# Patient Record
Sex: FEMALE | HISPANIC OR LATINO | Employment: OTHER | URBAN - METROPOLITAN AREA
[De-identification: names, ages, dates, MRNs, and addresses within clinical notes are randomized per-mention and may not be internally consistent; named-entity substitution may affect disease eponyms.]

---

## 2017-02-09 DIAGNOSIS — G47.00 INSOMNIA: Primary | ICD-10-CM

## 2017-02-09 RX ORDER — TRAZODONE HYDROCHLORIDE 50 MG/1
TABLET, FILM COATED ORAL
Qty: 180 TABLET | Refills: 0 | COMMUNITY
Start: 2017-02-09 | End: 2017-03-02

## 2017-02-09 NOTE — TELEPHONE ENCOUNTER
Kori Howard is requesting a refill of the following:  Pending Prescriptions:                       Disp   Refills    traZODone (DESYREL) 50 MG tablet [Pharmac*60 tab*0            Sig: TAKE 2 TABLETS BY MOUTH EVERY NIGHT AT BEDTIME    Last Refill : 02/10/2016  Last OV : 02/10/2016  Schedule OV : none     Is it okay for the 30 day supply with 0 refills until Kori Howard schedule an OV non fasting     Please close encounter of approved    Kelli Diaz MA

## 2017-02-14 DIAGNOSIS — G47.00 INSOMNIA: ICD-10-CM

## 2017-02-15 DIAGNOSIS — G47.00 INSOMNIA: ICD-10-CM

## 2017-02-15 RX ORDER — TRAZODONE HYDROCHLORIDE 50 MG/1
TABLET, FILM COATED ORAL
Qty: 180 TABLET | Refills: 0 | OUTPATIENT
Start: 2017-02-15

## 2017-02-15 RX ORDER — TRAZODONE HYDROCHLORIDE 50 MG/1
TABLET, FILM COATED ORAL
Qty: 60 TABLET | Refills: 0 | COMMUNITY
Start: 2017-02-15 | End: 2017-03-02

## 2017-02-15 NOTE — TELEPHONE ENCOUNTER
Called [facility] for Kori Howard refill please call pt. To schedule non fasting office visit

## 2017-02-27 ENCOUNTER — TRANSFERRED RECORDS (OUTPATIENT)
Dept: FAMILY MEDICINE | Facility: CLINIC | Age: 52
End: 2017-02-27

## 2017-03-02 ENCOUNTER — OFFICE VISIT (OUTPATIENT)
Dept: FAMILY MEDICINE | Facility: CLINIC | Age: 52
End: 2017-03-02

## 2017-03-02 VITALS
DIASTOLIC BLOOD PRESSURE: 80 MMHG | BODY MASS INDEX: 28.38 KG/M2 | HEART RATE: 64 BPM | OXYGEN SATURATION: 98 % | WEIGHT: 180.8 LBS | HEIGHT: 67 IN | TEMPERATURE: 98.1 F | SYSTOLIC BLOOD PRESSURE: 110 MMHG

## 2017-03-02 DIAGNOSIS — Z00.00 ROUTINE HISTORY AND PHYSICAL EXAMINATION OF ADULT: Primary | ICD-10-CM

## 2017-03-02 DIAGNOSIS — R53.81 MALAISE: ICD-10-CM

## 2017-03-02 DIAGNOSIS — B00.9 HERPES SIMPLEX VIRUS INFECTION: ICD-10-CM

## 2017-03-02 DIAGNOSIS — N39.3 FEMALE STRESS INCONTINENCE: ICD-10-CM

## 2017-03-02 DIAGNOSIS — F51.04 CHRONIC INSOMNIA: ICD-10-CM

## 2017-03-02 DIAGNOSIS — R73.09 ABNORMAL GLUCOSE: ICD-10-CM

## 2017-03-02 DIAGNOSIS — G89.29 CHRONIC LOW BACK PAIN WITHOUT SCIATICA, UNSPECIFIED BACK PAIN LATERALITY: ICD-10-CM

## 2017-03-02 DIAGNOSIS — M54.50 CHRONIC LOW BACK PAIN WITHOUT SCIATICA, UNSPECIFIED BACK PAIN LATERALITY: ICD-10-CM

## 2017-03-02 LAB
ERYTHROCYTE [DISTWIDTH] IN BLOOD BY AUTOMATED COUNT: 11.4 %
HBA1C MFR BLD: 5.6 % (ref 4–7)
HCT VFR BLD AUTO: 42.6 % (ref 35–47)
HEMOGLOBIN: 13.6 G/DL (ref 11.7–15.7)
MCH RBC QN AUTO: 29.4 PG (ref 26–33)
MCHC RBC AUTO-ENTMCNC: 31.9 G/DL (ref 31–36)
MCV RBC AUTO: 92.1 FL (ref 78–100)
PLATELET COUNT - QUEST: 270 10^9/L (ref 150–375)
RBC # BLD AUTO: 4.62 10*12/L (ref 3.8–5.2)
WBC # BLD AUTO: 7.2 10*9/L (ref 4–11)

## 2017-03-02 PROCEDURE — 80048 BASIC METABOLIC PNL TOTAL CA: CPT | Mod: 90 | Performed by: FAMILY MEDICINE

## 2017-03-02 PROCEDURE — 36415 COLL VENOUS BLD VENIPUNCTURE: CPT | Performed by: FAMILY MEDICINE

## 2017-03-02 PROCEDURE — 88142 CYTOPATH C/V THIN LAYER: CPT | Mod: 90 | Performed by: FAMILY MEDICINE

## 2017-03-02 PROCEDURE — 85027 COMPLETE CBC AUTOMATED: CPT | Performed by: FAMILY MEDICINE

## 2017-03-02 PROCEDURE — 87624 HPV HI-RISK TYP POOLED RSLT: CPT | Mod: 90 | Performed by: FAMILY MEDICINE

## 2017-03-02 PROCEDURE — 84443 ASSAY THYROID STIM HORMONE: CPT | Mod: 90 | Performed by: FAMILY MEDICINE

## 2017-03-02 PROCEDURE — 83036 HEMOGLOBIN GLYCOSYLATED A1C: CPT | Performed by: FAMILY MEDICINE

## 2017-03-02 PROCEDURE — 80061 LIPID PANEL: CPT | Mod: 90 | Performed by: FAMILY MEDICINE

## 2017-03-02 PROCEDURE — 99396 PREV VISIT EST AGE 40-64: CPT | Performed by: FAMILY MEDICINE

## 2017-03-02 RX ORDER — LANOLIN ALCOHOL/MO/W.PET/CERES
1000 CREAM (GRAM) TOPICAL DAILY
COMMUNITY
End: 2024-02-20

## 2017-03-02 RX ORDER — TRAZODONE HYDROCHLORIDE 50 MG/1
TABLET, FILM COATED ORAL
Qty: 180 TABLET | Refills: 0 | Status: SHIPPED | OUTPATIENT
Start: 2017-03-02 | End: 2018-03-22

## 2017-03-02 RX ORDER — IBUPROFEN 800 MG/1
TABLET, FILM COATED ORAL
Qty: 90 TABLET | Refills: 0 | Status: SHIPPED | OUTPATIENT
Start: 2017-03-02 | End: 2019-04-17

## 2017-03-02 RX ORDER — VALACYCLOVIR HYDROCHLORIDE 1 G/1
TABLET, FILM COATED ORAL
Qty: 12 TABLET | Status: SHIPPED | OUTPATIENT
Start: 2017-03-02 | End: 2018-03-22

## 2017-03-02 NOTE — PROGRESS NOTES
SUBJECTIVE:   Kori Howard is an 51 year old G 2 P 2 woman who presents for annual gyn exam.      Periods :irregular cycle: 2 weeks to 2 months  Menopause symptoms:denies  Current contraception:  has had a vasectomy   RODRÍGUEZ exposure: no   History of abnormal Pap smear: yes - Leep done in 1995   Family history of uterine or ovarian cancer: no   Regular self breast exam: yes   History of abnormal mammogram: no   Family history of breast cancer: no   History of abnormal lipids: no    Health Care Maintenance  ACT  Mammogram 2/2017 Normal  Colonoscopy 1/2013- repeat ten years  Pap smear: Normal 2/2015  Dexa: not menopauses  Exercise: no regular program    Patient Active Problem List   Diagnosis     Family history of diabetes mellitus     Herpes simplex virus (HSV) infection     Abnormal glucose     ACP (advance care planning)     Health Care Home     Asthma, exercise induced     Insomnia     Past Surgical History   Procedure Laterality Date     C flor w/o facetec foramot/dskc 1/2 vrt seg, lumbar       C nonspecific procedure  1995     LEEP     Hc colonoscopy thru stoma, diagnostic  1/5/2009     normal/ Stone/ repeat in 10 years     Hysteroscopy,ablation endometrium       Colonoscopy  1/5/2009     NORMAL/ REPEAT IN 10 years   mesh sling- 2009  Family History   Problem Relation Age of Onset     CANCER No family hx of      DIABETES Father      HEART DISEASE Father      Hypertension Father      HEART DISEASE Mother      Current Outpatient Prescriptions   Medication     traZODone (DESYREL) 50 MG tablet     traZODone (DESYREL) 50 MG tablet     valACYclovir (VALTREX) 1000 mg tablet     ibuprofen (ADVIL,MOTRIN) 800 MG tablet     Cholecalciferol (VITAMIN D3) 3000 UNITS TABS     Flaxseed, Linseed, (FLAX SEED OIL) 1000 MG capsule     albuterol (PROAIR HFA, PROVENTIL HFA, VENTOLIN HFA) 108 (90 BASE) MCG/ACT inhaler     magnesium 100 MG CAPS     Calcium 150 MG TABS     cetirizine HCl (ZYRTEC ALLERGY) 10 MG CAPS      "albuterol 90 MCG/ACT inhaler     MULTIVITAMINS OR     FISH OIL 1000 MG OR CAPS     No current facility-administered medications for this visit.      Review Of Systems  Skin: herpes outbreak, one this year  Eyes: negative  Ears/Nose/Throat: negative  Respiratory:excessive daytime drowsiness- snores  Cardiovascular: negative  Gastrointestinal: negative  Genitourinary:  stress incontinence with exercise- failed previous sling surgery/ decreased libido  Musculoskeletal: back pain- does a lot of standing at work  Neurologic: negative  Psychiatric:  insomnia is controlled on trazodone- taking one at bedtime  Hematologic/Lymphatic/Immunologic: negative  Endocrine: negative    OBJECTIVE:  /80 (BP Location: Left arm, Patient Position: Chair, Cuff Size: Adult Regular)  Pulse 64  Temp 98.1  F (36.7  C) (Oral)  Ht 1.702 m (5' 7\")  Wt 82 kg (180 lb 12.8 oz)  LMP 02/16/2017  SpO2 98%  BMI 28.32 kg/m2  General appearance: Healthy    Skin: Normal. No atypical appearing moles on inspection of trunk and extremities.    External ears  and canals clear bilaterally. TM's normal bilaterally. Nose normal without lesions or discharge. Oropharynx normal. Neck supple without palpable adenopathy.    Breasts are symmetric.  No dominant, discrete, fixed  or suspicious masses are noted.  No skin or nipple changes or axillary nodes.      Regular rate and  rhythm. S1 and S2 normal, no murmurs, clicks, gallops or rubs. No edema or JVD. Chest is clear; no wheezes or rales.    The abdomen is soft without tenderness, guarding, mass or organomegaly. Bowel sounds are normal. No CVA tenderness or inguinal adenopathy noted.    Pelvic:  Vagina and vulva are normal.   No palpable uterine or adnexal masses or tenderness.  Pap obtained  Extremities: negative.    Assessment    (Z00.00) Routine history and physical examination of adult  (primary encounter diagnosis)  Comment:   Plan: Lipid Profile, BASIC METABOLIC PANEL ([facility]),         VENOUS COLLECTION, " HEMOGRAM/PLATELET (BFP),         cyanocobalamin (VITAMIN  B-12) 1000 MCG tablet,        TSH with free T4 reflex ([facility]), Hemoglobin A1c        (BFP), ThinPrep Pap and HPV (mRNa E6/E7) ([facility])            (R53.81) Malaise  Comment:   Plan: BASIC METABOLIC PANEL (QUEST), VENOUS         COLLECTION, HEMOGRAM/PLATELET (BFP), TSH with         free T4 reflex (QUEST), Sleep Study Referral            (M54.5,  G89.29) Chronic low back pain without sciatica, unspecified back pain laterality  Comment:   Plan: ibuprofen (ADVIL/MOTRIN) 800 MG tablet            (R73.09) Abnormal glucose  Comment:   Plan: BASIC METABOLIC PANEL (QUEST), VENOUS         COLLECTION, Hemoglobin A1c (BFP)            (N39.3) Female stress incontinence  Comment:   Plan: UROLOGY ADULT REFERRAL            (B00.9) Herpes simplex virus infection  Comment:   Plan: valACYclovir (VALTREX) 1000 mg tablet           (F51.04) Chronic insomnia  Comment:   Plan: traZODone (DESYREL) 50 MG tablet

## 2017-03-02 NOTE — NURSING NOTE
Kori is here for a fasting physical and possible Pap.     Patient is here for a full physical exam and pap.  Pre-Visit Screening :  Immunizations : up to date  Colonoscopy : up to date  Mammogram : up to date  Asthma Action Plan/Test : exercise induced  PHQ9/GAD7 : na  Pulse - regular  Medication Reconciliation: complete    [unfilled]  ETOH screening:  Questions:  1-How often do you have a drink containing alcohol?                           7 times per week(s)  2-How many drinks containing alcohol do you have on a typical day when you are         Drinking?                              1   3- How often do you have 5 or more drinks on one occasion?                              NO     Have you ever:  @None of the patient's responses to the CAGE screening were positive / Negative CAGE score@    Essie.SAVANNA Lopez ([facility])

## 2017-03-02 NOTE — MR AVS SNAPSHOT
After Visit Summary   3/2/2017    Kori Howard    MRN: [id number]           Patient Information     Date Of Birth          1965        Visit Information        Provider Department      3/2/2017 8:00 AM Carol Fleming MD [facility]        Today's Diagnoses     Routine history and physical examination of adult    -  1    Malaise        Chronic low back pain without sciatica, unspecified back pain laterality        Abnormal glucose        Female stress incontinence        Herpes simplex virus infection        Chronic insomnia           Follow-ups after your visit        Additional Services     Sleep Study Referral       [facility], [phone]            UROLOGY ADULT REFERRAL       Your provider has referred you to: [facility] - [place] [phone]   [email or web address]    Dr Durand    Please be aware that coverage of these services is subject to the terms and limitations of your health insurance plan.  Call member services at your health plan with any benefit or coverage questions.      Please bring the following with you to your appointment:    (1) Any X-Rays, CTs or MRIs which have been performed.  Contact the facility where they were done to arrange for  prior to your scheduled appointment.    (2) List of current medications  (3) This referral request   (4) Any documents/labs given to you for this referral                  Who to contact     If you have questions or need follow up information about today's clinic visit or your schedule please contact [facility] directly at [phone].  Normal or non-critical lab and imaging results will be communicated to you by MyChart, letter or phone within 4 business days after the clinic has received the results. If you do not hear from us within 7 days, please contact the clinic through MyChart or phone. If you have a critical or  "abnormal lab result, we will notify you by phone as soon as possible.  Submit refill requests through Lumicity or call your pharmacy and they will forward the refill request to us. Please allow 3 business days for your refill to be completed.          Additional Information About Your Visit        COMPS.comhart Information     Lumicity gives you secure access to your electronic health record. If you see a primary care provider, you can also send messages to your care team and make appointments. If you have questions, please call your primary care clinic.  If you do not have a primary care provider, please call [phone] and they will assist you.        Care EveryWhere ID     This is your Care EveryWhere ID. This could be used by other organizations to access your [place] medical records  [id number]        Your Vitals Were     Pulse Temperature Height Last Period Pulse Oximetry BMI (Body Mass Index)    64 98.1  F (36.7  C) (Oral) 1.702 m (5' 7\") 02/16/2017 98% 28.32 kg/m2       Blood Pressure from Last 3 Encounters:   03/02/17 110/80   02/10/16 128/72   02/05/15 114/68    Weight from Last 3 Encounters:   03/02/17 82 kg (180 lb 12.8 oz)   02/10/16 83.8 kg (184 lb 12.8 oz)   02/05/15 83.5 kg (184 lb)              We Performed the Following     BASIC METABOLIC PANEL ([facility])     Hemoglobin A1c (BFP)     HEMOGRAM/PLATELET (BFP)     Lipid Profile     Sleep Study Referral     TSH with free T4 reflex ([facility])     UROLOGY ADULT REFERRAL     VENOUS COLLECTION          Today's Medication Changes          These changes are accurate as of: 3/2/17  8:50 AM.  If you have any questions, ask your nurse or doctor.               These medicines have changed or have updated prescriptions.        Dose/Directions    traZODone 50 MG tablet   Commonly known as:  DESYREL   This may have changed:  See the new instructions.   Used for:  Chronic insomnia   Changed by:  Carol Fleming MD        TAKE 2 TABLETS BY MOUTH EVERY NIGHT AT BEDTIME "   Quantity:  180 tablet   Refills:  0            Where to get your medicines      These medications were sent to [facility] - [place], [place] - [address] AT [address] & [address]  [address], [facility] [address]     Phone:  [phone]     ibuprofen 800 MG tablet    traZODone 50 MG tablet    valACYclovir 1000 mg tablet                Primary Care Provider Office Phone # Fax #    Carol Fleming [phone]       [facility] [address]  [facility] [address]        Thank you!     Thank you for choosing [facility]  for your care. Our goal is always to provide you with excellent care. Hearing back from our patients is one way we can continue to improve our services. Please take a few minutes to complete the written survey that you may receive in the mail after your visit with us. Thank you!             Your Updated Medication List - Protect others around you: Learn how to safely use, store and throw away your medicines at www.disposemymeds.org.          This list is accurate as of: 3/2/17  8:50 AM.  Always use your most recent med list.                   Brand Name Dispense Instructions for use    albuterol 108 (90 BASE) MCG/ACT Inhaler    PROAIR HFA/PROVENTIL HFA/VENTOLIN HFA    1 Inhaler    Inhale 2 puffs into the lungs every 6 hours       albuterol 90 MCG/ACT inhaler     1 Inhaler    Inhale 2 puffs into the lungs every 4 hours as needed for shortness of breath / dyspnea.       Calcium 150 MG Tabs          cyanocobalamin 1000 MCG tablet    vitamin  B-12     Take 1,000 mcg by mouth daily       fish oil-omega-3 fatty acids 1000 MG capsule      None Entered       flax seed oil 1000 MG capsule      Take 1 capsule by mouth daily       ibuprofen 800 MG tablet    ADVIL/MOTRIN    90 tablet    TAKE ONE TABLET BY MOUTH EVERY 6 HOURS AS NEEDED FOR PAIN.       magnesium 100 MG Caps          MULTIVITAMINS PO       None Entered       traZODone 50 MG tablet    DESYREL    180 tablet    TAKE 2 TABLETS BY MOUTH EVERY NIGHT AT BEDTIME       valACYclovir 1000 mg tablet    VALTREX    12 tablet    2 tablets with onset of symptoms: repeat dose in 12 hours (update refills only)       Vitamin D3 3000 UNITS Tabs          ZYRTEC ALLERGY 10 MG Caps   Generic drug:  cetirizine HCl

## 2017-03-03 LAB
BUN SERPL-MCNC: 13 MG/DL (ref 7–25)
BUN/CREATININE RATIO: NORMAL (CALC) (ref 6–22)
CALCIUM SERPL-MCNC: 9.5 MG/DL (ref 8.6–10.4)
CHLORIDE SERPLBLD-SCNC: 102 MMOL/L (ref 98–110)
CHOLEST SERPL-MCNC: 208 MG/DL (ref 125–200)
CHOLEST/HDLC SERPL: 2.4 (CALC)
CO2 SERPL-SCNC: 25 MMOL/L (ref 20–31)
CREAT SERPL-MCNC: 0.67 MG/DL (ref 0.5–1.05)
EGFR AFRICAN AMERICAN - QUEST: 119 ML/MIN/1.73M2
GFR SERPL CREATININE-BSD FRML MDRD: 102 ML/MIN/1.73M2
GLUCOSE - QUEST: 95 MG/DL (ref 65–99)
HDLC SERPL-MCNC: 86 MG/DL
LDLC SERPL CALC-MCNC: 107 MG/DL (CALC)
NONHDLC SERPL-MCNC: 122 MG/DL (CALC)
POTASSIUM SERPL-SCNC: 4.3 MMOL/L (ref 3.5–5.3)
SODIUM SERPL-SCNC: 138 MMOL/L (ref 135–146)
TRIGL SERPL-MCNC: 77 MG/DL
TSH SERPL-ACNC: 2.49 MIU/L

## 2017-03-07 LAB
CLINICAL HISTORY - QUEST: NORMAL
CYTOTECHNOLOGIST - QUEST: NORMAL
DESCRIPTIVE DIAGNOSIS - QUEST: NORMAL
HPV MRNA E6/E7: NOT DETECTED
LAST PAP DX - QUEST: NORMAL
LMP - QUEST: NORMAL
PREV BX DX - QUEST: NORMAL
SOURCE: NORMAL
STATEMENT OF ADEQUACY - QUEST: NORMAL

## 2017-04-16 ENCOUNTER — MYC REFILL (OUTPATIENT)
Dept: FAMILY MEDICINE | Facility: CLINIC | Age: 52
End: 2017-04-16

## 2017-04-16 DIAGNOSIS — G89.29 CHRONIC LOW BACK PAIN WITHOUT SCIATICA, UNSPECIFIED BACK PAIN LATERALITY: ICD-10-CM

## 2017-04-16 DIAGNOSIS — B00.9 HERPES SIMPLEX VIRUS INFECTION: ICD-10-CM

## 2017-04-16 DIAGNOSIS — F51.04 CHRONIC INSOMNIA: ICD-10-CM

## 2017-04-16 DIAGNOSIS — M54.50 CHRONIC LOW BACK PAIN WITHOUT SCIATICA, UNSPECIFIED BACK PAIN LATERALITY: ICD-10-CM

## 2017-04-16 RX ORDER — IBUPROFEN 800 MG/1
TABLET, FILM COATED ORAL
Qty: 90 TABLET | Refills: 0 | Status: CANCELLED | OUTPATIENT
Start: 2017-04-16

## 2017-04-16 RX ORDER — VALACYCLOVIR HYDROCHLORIDE 1 G/1
TABLET, FILM COATED ORAL
Qty: 12 TABLET | Status: CANCELLED | OUTPATIENT
Start: 2017-04-16

## 2017-04-16 RX ORDER — TRAZODONE HYDROCHLORIDE 50 MG/1
TABLET, FILM COATED ORAL
Qty: 180 TABLET | Refills: 0 | Status: CANCELLED | OUTPATIENT
Start: 2017-04-16

## 2017-04-17 NOTE — TELEPHONE ENCOUNTER
[facility]:  Kori Howard would like a refill of the following medications:  ibuprofen (ADVIL/MOTRIN) 800 MG tablet [Carol Fleming MD]  traZODone (DESYREL) 50 MG tablet [Carol Fleming MD]  valACYclovir (VALTREX) 1000 mg tablet [Carol Fleming MD]    Preferred pharmacy: [facility] [address] [place] [address] [address] AT [place] [address] & [facility]    Comment:  please refill, thanks you

## 2017-06-13 ENCOUNTER — TELEPHONE (OUTPATIENT)
Dept: FAMILY MEDICINE | Facility: CLINIC | Age: 52
End: 2017-06-13

## 2017-07-11 ENCOUNTER — MYC MEDICAL ADVICE (OUTPATIENT)
Dept: FAMILY MEDICINE | Facility: CLINIC | Age: 52
End: 2017-07-11

## 2018-03-22 ENCOUNTER — OFFICE VISIT (OUTPATIENT)
Dept: FAMILY MEDICINE | Facility: CLINIC | Age: 53
End: 2018-03-22

## 2018-03-22 VITALS
RESPIRATION RATE: 20 BRPM | HEIGHT: 67 IN | DIASTOLIC BLOOD PRESSURE: 72 MMHG | BODY MASS INDEX: 26.84 KG/M2 | SYSTOLIC BLOOD PRESSURE: 110 MMHG | HEART RATE: 76 BPM | TEMPERATURE: 97.8 F | WEIGHT: 171 LBS

## 2018-03-22 DIAGNOSIS — F51.04 CHRONIC INSOMNIA: ICD-10-CM

## 2018-03-22 DIAGNOSIS — Z00.00 ROUTINE HISTORY AND PHYSICAL EXAMINATION OF ADULT: Primary | ICD-10-CM

## 2018-03-22 DIAGNOSIS — N95.2 ATROPHIC VAGINITIS: ICD-10-CM

## 2018-03-22 DIAGNOSIS — N95.1 MENOPAUSAL SYNDROME (HOT FLASHES): ICD-10-CM

## 2018-03-22 DIAGNOSIS — B00.9 HERPES SIMPLEX VIRUS INFECTION: ICD-10-CM

## 2018-03-22 LAB
ERYTHROCYTE [DISTWIDTH] IN BLOOD BY AUTOMATED COUNT: 12.3 %
GLUCOSE SERPL-MCNC: 103 MG/DL (ref 60–99)
HBA1C MFR BLD: 5.3 % (ref 4–7)
HCT VFR BLD AUTO: 40.9 % (ref 35–47)
HEMOGLOBIN: 13.5 G/DL (ref 11.7–15.7)
MCH RBC QN AUTO: 29.2 PG (ref 26–33)
MCHC RBC AUTO-ENTMCNC: 33 G/DL (ref 31–36)
MCV RBC AUTO: 88.3 FL (ref 78–100)
PLATELET COUNT - QUEST: 316 10^9/L (ref 150–375)
RBC # BLD AUTO: 4.63 10*12/L (ref 3.8–5.2)
WBC # BLD AUTO: 7.7 10*9/L (ref 4–11)

## 2018-03-22 PROCEDURE — 82947 ASSAY GLUCOSE BLOOD QUANT: CPT | Performed by: FAMILY MEDICINE

## 2018-03-22 PROCEDURE — 36415 COLL VENOUS BLD VENIPUNCTURE: CPT | Performed by: FAMILY MEDICINE

## 2018-03-22 PROCEDURE — 99396 PREV VISIT EST AGE 40-64: CPT | Performed by: FAMILY MEDICINE

## 2018-03-22 PROCEDURE — 80061 LIPID PANEL: CPT | Mod: 90 | Performed by: FAMILY MEDICINE

## 2018-03-22 PROCEDURE — 85027 COMPLETE CBC AUTOMATED: CPT | Performed by: FAMILY MEDICINE

## 2018-03-22 PROCEDURE — 83036 HEMOGLOBIN GLYCOSYLATED A1C: CPT | Performed by: FAMILY MEDICINE

## 2018-03-22 RX ORDER — GABAPENTIN 300 MG/1
300 CAPSULE ORAL AT BEDTIME
Qty: 30 CAPSULE | Refills: 0 | Status: SHIPPED | OUTPATIENT
Start: 2018-03-22 | End: 2019-04-17

## 2018-03-22 RX ORDER — TRAZODONE HYDROCHLORIDE 50 MG/1
TABLET, FILM COATED ORAL
Qty: 180 TABLET | Refills: 3 | Status: SHIPPED | OUTPATIENT
Start: 2018-03-22 | End: 2019-03-21

## 2018-03-22 RX ORDER — VALACYCLOVIR HYDROCHLORIDE 1 G/1
TABLET, FILM COATED ORAL
Qty: 12 TABLET | Status: SHIPPED | OUTPATIENT
Start: 2018-03-22 | End: 2019-04-17

## 2018-03-22 RX ORDER — ESTRADIOL 0.1 MG/G
1 CREAM VAGINAL
Qty: 42.5 G | Refills: 3 | Status: SHIPPED | OUTPATIENT
Start: 2018-03-22 | End: 2020-06-16

## 2018-03-22 NOTE — MR AVS SNAPSHOT
After Visit Summary   3/22/2018    Kori Howard    MRN: [id number]           Patient Information     Date Of Birth          1965        Visit Information        Provider Department      3/22/2018 8:00 AM Carol Fleming MD [facility]        Today's Diagnoses     Routine history and physical examination of adult    -  1    Chronic insomnia        Herpes simplex virus infection        Atrophic vaginitis        Menopausal syndrome (hot flashes)           Follow-ups after your visit        Future tests that were ordered for you today     Open Future Orders        Priority Expected Expires Ordered    Mammo Screening digital (bilat) Routine  3/22/2019 3/22/2018            Who to contact     If you have questions or need follow up information about today's clinic visit or your schedule please contact [facility] directly at [phone].  Normal or non-critical lab and imaging results will be communicated to you by Appierhart, letter or phone within 4 business days after the clinic has received the results. If you do not hear from us within 7 days, please contact the clinic through Appierhart or phone. If you have a critical or abnormal lab result, we will notify you by phone as soon as possible.  Submit refill requests through [employer] or call your pharmacy and they will forward the refill request to us. Please allow 3 business days for your refill to be completed.          Additional Information About Your Visit        MyChart Information     [employer] gives you secure access to your electronic health record. If you see a primary care provider, you can also send messages to your care team and make appointments. If you have questions, please call your primary care clinic.  If you do not have a primary care provider, please call [phone] and they will assist you.        Care EveryWhere ID     This is your Care EveryWhere ID. This could be used by  "other organizations to access your [facility] medical records  [id number]        Your Vitals Were     Pulse Temperature Respirations Height Last Period Breastfeeding?    76 97.8  F (36.6  C) (Oral) 20 1.689 m (5' 6.5\") 12/25/2017 No    BMI (Body Mass Index)                   27.19 kg/m2            Blood Pressure from Last 3 Encounters:   03/22/18 110/72   03/02/17 110/80   02/10/16 128/72    Weight from Last 3 Encounters:   03/22/18 77.6 kg (171 lb)   03/02/17 82 kg (180 lb 12.8 oz)   02/10/16 83.8 kg (184 lb 12.8 oz)              We Performed the Following     Glucose Fasting (BFP)     Hemoglobin A1c (BFP)     HEMOGRAM/PLATELET (BFP)     Lipid Profile     VENOUS COLLECTION          Today's Medication Changes          These changes are accurate as of 3/22/18  8:39 AM.  If you have any questions, ask your nurse or doctor.               Start taking these medicines.        Dose/Directions    estradiol 0.1 MG/GM cream   Commonly known as:  ESTRACE   Used for:  Atrophic vaginitis   Started by:  Carol Fleming MD        Dose:  1 g   Place 1 g vaginally twice a week   Quantity:  42.5 g   Refills:  3       gabapentin 300 MG capsule   Commonly known as:  NEURONTIN   Used for:  Menopausal syndrome (hot flashes)   Started by:  Carol Fleming MD        Dose:  300 mg   Take 1 capsule (300 mg) by mouth At Bedtime   Quantity:  30 capsule   Refills:  0            Where to get your medicines      These medications were sent to [facility] [address] [place] [address] [address] [facility] [address]     Phone:  [phone]     gabapentin 300 MG capsule    traZODone 50 MG tablet         Some of these will need a paper prescription and others can be bought over the counter.  Ask your nurse if you have questions.     Bring a paper prescription for each of these medications     estradiol 0.1 MG/GM cream                Primary Care Provider Office Phone # " Fax #    Carol Fleming [phone] [address] [facility] [address]  [facility] [address]        Equal Access to Services     MARIIAMIGUEL BYRON : Hadsrini colton machuca karinao Sosima, waaxda luqadaha, qaybta kaalmada vishnu, sally pisanocici dash. So [facility] [phone].    ATENCIÓN: Si habla español, tiene a gaming disposición servicios gratuitos de asistencia lingüística. Llame al [phone].    We comply with applicable federal civil rights laws and [place] laws. We do not discriminate on the basis of race, color, national origin, age, disability, sex, sexual orientation, or gender identity.            Thank you!     Thank you for choosing [facility]  for your care. Our goal is always to provide you with excellent care. Hearing back from our patients is one way we can continue to improve our services. Please take a few minutes to complete the written survey that you may receive in the mail after your visit with us. Thank you!             Your Updated Medication List - Protect others around you: Learn how to safely use, store and throw away your medicines at www.disposemymeds.org.          This list is accurate as of 3/22/18  8:39 AM.  Always use your most recent med list.                   Brand Name Dispense Instructions for use Diagnosis    albuterol 108 (90 BASE) MCG/ACT Inhaler    PROAIR HFA/PROVENTIL HFA/VENTOLIN HFA    1 Inhaler    Inhale 2 puffs into the lungs every 6 hours    Asthma, exercise induced       albuterol 90 MCG/ACT inhaler     1 Inhaler    Inhale 2 puffs into the lungs every 4 hours as needed for shortness of breath / dyspnea.    Asthma, exercise induced       Calcium 150 MG Tabs           cyanocobalamin 1000 MCG tablet    vitamin  B-12     Take 1,000 mcg by mouth daily    Routine history and physical examination of adult       estradiol 0.1 MG/GM cream    ESTRACE    42.5 g    Place 1 g vaginally twice a week    Atrophic vaginitis        fish oil-omega-3 fatty acids 1000 MG capsule      None Entered        flax seed oil 1000 MG capsule      Take 1 capsule by mouth daily        gabapentin 300 MG capsule    NEURONTIN    30 capsule    Take 1 capsule (300 mg) by mouth At Bedtime    Menopausal syndrome (hot flashes)       ibuprofen 800 MG tablet    ADVIL/MOTRIN    90 tablet    TAKE ONE TABLET BY MOUTH EVERY 6 HOURS AS NEEDED FOR PAIN.    Chronic low back pain without sciatica, unspecified back pain laterality       magnesium 100 MG Caps           MULTIVITAMINS PO      None Entered        traZODone 50 MG tablet    DESYREL    180 tablet    TAKE 2 TABLETS BY MOUTH EVERY NIGHT AT BEDTIME    Chronic insomnia       valACYclovir 1000 mg tablet    VALTREX    12 tablet    2 tablets with onset of symptoms: repeat dose in 12 hours (update refills only)    Herpes simplex virus infection       Vitamin D3 3000 UNITS Tabs           ZYRTEC ALLERGY 10 MG Caps   Generic drug:  cetirizine HCl

## 2018-03-22 NOTE — NURSING NOTE
Kori Howard is here for a CPX.    Pre-visit planning  Immunizations -up to date  Colonoscopy -is up to date  Mammogram -is up to date  Asthma test --  PHQ9 -  RIVKA 7 -    Questioned patient about current smoking habits.  Pt. has never smoked.  Body mass index is 27.19 kg/(m^2).  PULSE regular  My Chart: active  CLASSIFICATION OF OVERWEIGHT AND OBESITY BY BMI                        Obesity Class           BMI(kg/m2)  Underweight                                    < 18.5  Normal                                         18.5-24.9  Overweight                                     25.0-29.9  OBESITY                     I                  30.0-34.9                             II                 35.0-39.9  EXTREME OBESITY             III                >40                            Patient's  BMI Body mass index is 27.19 kg/(m^2).  Http://hin.nhlbi.nih.gov/menuplanner/menu.cgi  ETOH screening:  Questions:  1-How often do you have a drink containing alcohol?                             1 times per month(s)  2-How many drinks containing alcohol do you have on a typical day when you are         Drinking?                              1   3- How often do you have 5 or more drinks on one occasion?                              never per     Have you ever:  None of the patient's responses to the CAGE screening were positive / Negative CAGE score

## 2018-03-22 NOTE — PROGRESS NOTES
Chief Complaint: Kori Howard is an 52 year old woman who presents for preventive health visit.      Besides routine health maintenance,  she would like to discuss : irregular. menstrual cycle: Last period in December  Symptoms:  Hot flashes  Vaginal dryness  Estrogen vaginal cream advised    Nutritional consultation: lost fifteen pounds with change in diet  Less gluten- not gluten free  Healthy carbs    Off all medication until January  Current supplements as recommended by her nutritionist:  Glutamine  bifadol  Fish oil  Tumeric tea    Testing blood sugar (parent meter): contour-  On rare occasions, blood sugars running over 100    Health Care Maintenance  Pap is up to date  Mammogram Normal 2/2017  Colonoscopy due next year  ACT/AAP      Healthy Habits:  Do you get at least three servings of calcium containing foods daily (dairy, green leafy vegetables, etc.)? Yes  Taking calcium supplements with D  Outside of work or daily activities, how many days per week do you exercise for 30 minutes or longer? Yoga and running  Have you had an eye exam in the past two years? yes  Do you see a dentist twice per year? yes    PHQ-2  Over the last two weeks- Have you been bothered by little interest or pleasure in doing things?  No  Over the last two weeks- Have you been feeling down, depressed, or hopeless?  No    Advanced directive: encouraged    Social History   Substance Use Topics     Smoking status: Never Smoker     Smokeless tobacco: Never Used     Alcohol use 0.0 oz/week     0 drink(s) per week      Comment: on occ       Reviewed orders with patient.  Reviewed health maintenance and updated orders accordingly - Yes      History of abnormal Pap smear: NO - age 30- 65 PAP every 3 years recommended  All Histories reviewed and updated in Epic.      ROS:  C: NEGATIVE for fever, chills, change in weight  I: NEGATIVE for worrisome rashes, moles or lesions  E: NEGATIVE for vision changes or irritation  ENT:  "recovering from recent cold  R: NEGATIVE for significant cough or SOB  B: NEGATIVE for masses, tenderness or discharge  CV: NEGATIVE for chest pain, palpitations or peripheral edema  GI: NEGATIVE for nausea, abdominal pain, heartburn, or change in bowel habits  : NEGATIVE for unusual urinary or vaginal symptoms. Periods are regular.  M: NEGATIVE for significant arthralgias or myalgia  N: NEGATIVE for weakness, dizziness or paresthesias  P: NEGATIVE for changes in mood or affect      OBJECTIVE:  /72  Pulse 76  Temp 97.8  F (36.6  C) (Oral)  Resp 20  Ht 1.689 m (5' 6.5\")  Wt 77.6 kg (171 lb)  LMP 12/25/2017  Breastfeeding? No  BMI 27.19 kg/m2  General appearance: Healthy    Skin: Normal. No atypical appearing moles on inspection of trunk and extremities.    External ears  and canals clear bilaterally. TM's normal bilaterally. Nose normal without lesions or discharge. Oropharynx normal. Neck supple without palpable adenopathy.    Breasts are symmetric.  No dominant, discrete, fixed  or suspicious masses are noted.  No skin or nipple changes or axillary nodes.      Regular rate and  rhythm. S1 and S2 normal, no murmurs, clicks, gallops or rubs. No edema or JVD. Chest is clear; no wheezes or rales.    The abdomen is soft without tenderness, guarding, mass or organomegaly. Bowel sounds are normal. No CVA tenderness or inguinal adenopathy noted.    Pelvic:  Vagina- pale mucosa/ changes of estrogen deficiency. Vulva  normal.   Pap deferred    Rectal exam: deferred    Extremities: negative.      COUNSELING:  Reviewed preventive health counseling, as reflected in patient instructions    ATP III Guidelines  ICSI Preventive Guidelines    ASSESSMENT/PLAN:  (Z00.00) Routine history and physical examination of adult  (primary encounter diagnosis)  Comment:   Plan: Lipid Profile, Glucose Fasting (BFP), VENOUS         COLLECTION, HEMOGRAM/PLATELET (BFP), Mammo         Screening digital (bilat), Hemoglobin A1c (BFP)   "          (F51.04) Chronic insomnia  Comment:   Plan: traZODone (DESYREL) 50 MG tablet            (B00.9) Herpes simplex virus infection  Comment:   Plan: valACYclovir (VALTREX) 1000 mg tablet            (N95.2) Atrophic vaginitis  Comment:   Plan: estradiol (ESTRACE) 0.1 MG/GM cream           (N95.1) Menopausal syndrome (hot flashes)  Comment: recommended either trazodone or gabapentin for insomnia- flashes- but not both  Plan: gabapentin (NEURONTIN) 300 MG capsule

## 2018-03-23 ENCOUNTER — MYC REFILL (OUTPATIENT)
Dept: FAMILY MEDICINE | Facility: CLINIC | Age: 53
End: 2018-03-23

## 2018-03-23 DIAGNOSIS — N95.2 ATROPHIC VAGINITIS: ICD-10-CM

## 2018-03-23 LAB
CHOLEST SERPL-MCNC: 204 MG/DL
CHOLEST/HDLC SERPL: 2.2 (CALC)
HDLC SERPL-MCNC: 94 MG/DL
LDLC SERPL CALC-MCNC: 96 MG/DL (CALC)
NONHDLC SERPL-MCNC: 110 MG/DL (CALC)
TRIGL SERPL-MCNC: 59 MG/DL

## 2018-03-23 RX ORDER — ESTRADIOL 0.1 MG/G
1 CREAM VAGINAL
Qty: 42.5 G | Refills: 3 | Status: CANCELLED | OUTPATIENT
Start: 2018-03-26

## 2018-03-24 NOTE — TELEPHONE ENCOUNTER
Message from [employer]:  Kori Howard would like a refill of the following medications:  estradiol (ESTRACE) 0.1 MG/GM cream [Carol Fleming MD]    Preferred pharmacy: [facility] [address] [place] [address] - [address] [place] [address] [address] AT [facility] & [facility] [address]    Comment:  Jessica never got this prescription. did you send it? Also, didn't do a cholesterol test with my labs? Last year I had 208 that was a bit high. Need a new test this year for my nutritionist. do I need to come back and give more blood? my sugar is 103, so should I be testing more frequently? Could you get a prescription for a meter and strips for that? thanks you< Kori Howard

## 2018-04-07 ENCOUNTER — TELEPHONE (OUTPATIENT)
Dept: FAMILY MEDICINE | Facility: CLINIC | Age: 53
End: 2018-04-07

## 2018-04-28 ENCOUNTER — HEALTH MAINTENANCE LETTER (OUTPATIENT)
Age: 53
End: 2018-04-28

## 2019-03-21 ENCOUNTER — TRANSFERRED RECORDS (OUTPATIENT)
Dept: FAMILY MEDICINE | Facility: CLINIC | Age: 54
End: 2019-03-21

## 2019-03-21 LAB — MAMMOGRAM: NORMAL

## 2019-03-22 ENCOUNTER — HEALTH MAINTENANCE LETTER (OUTPATIENT)
Age: 54
End: 2019-03-22

## 2019-04-17 ENCOUNTER — OFFICE VISIT (OUTPATIENT)
Dept: FAMILY MEDICINE | Facility: CLINIC | Age: 54
End: 2019-04-17

## 2019-04-17 VITALS
HEART RATE: 67 BPM | HEIGHT: 66 IN | WEIGHT: 181 LBS | TEMPERATURE: 97.7 F | OXYGEN SATURATION: 98 % | BODY MASS INDEX: 29.09 KG/M2 | DIASTOLIC BLOOD PRESSURE: 64 MMHG | SYSTOLIC BLOOD PRESSURE: 110 MMHG

## 2019-04-17 DIAGNOSIS — N95.1 MENOPAUSAL SYNDROME (HOT FLASHES): ICD-10-CM

## 2019-04-17 DIAGNOSIS — Z12.11 SPECIAL SCREENING FOR MALIGNANT NEOPLASMS, COLON: ICD-10-CM

## 2019-04-17 DIAGNOSIS — F51.04 CHRONIC INSOMNIA: ICD-10-CM

## 2019-04-17 DIAGNOSIS — R73.09 ABNORMAL GLUCOSE: ICD-10-CM

## 2019-04-17 DIAGNOSIS — B00.9 HERPES SIMPLEX VIRUS INFECTION: ICD-10-CM

## 2019-04-17 DIAGNOSIS — Z00.00 ROUTINE HISTORY AND PHYSICAL EXAMINATION OF ADULT: Primary | ICD-10-CM

## 2019-04-17 PROCEDURE — 99396 PREV VISIT EST AGE 40-64: CPT | Performed by: FAMILY MEDICINE

## 2019-04-17 PROCEDURE — 36415 COLL VENOUS BLD VENIPUNCTURE: CPT | Performed by: FAMILY MEDICINE

## 2019-04-17 RX ORDER — GABAPENTIN 300 MG/1
300 CAPSULE ORAL AT BEDTIME
Qty: 30 CAPSULE | Refills: 11 | Status: SHIPPED | OUTPATIENT
Start: 2019-04-17 | End: 2019-04-17

## 2019-04-17 RX ORDER — GABAPENTIN 300 MG/1
300 CAPSULE ORAL AT BEDTIME
Qty: 90 CAPSULE | Refills: 3 | Status: SHIPPED | OUTPATIENT
Start: 2019-04-17 | End: 2019-10-28

## 2019-04-17 RX ORDER — TRAZODONE HYDROCHLORIDE 50 MG/1
TABLET, FILM COATED ORAL
Qty: 180 TABLET | Refills: 3 | Status: SHIPPED | OUTPATIENT
Start: 2019-04-17 | End: 2019-10-27

## 2019-04-17 RX ORDER — VALACYCLOVIR HYDROCHLORIDE 1 G/1
TABLET, FILM COATED ORAL
Qty: 12 TABLET | Status: SHIPPED | OUTPATIENT
Start: 2019-04-17 | End: 2020-06-16

## 2019-04-17 RX ORDER — TRAZODONE HYDROCHLORIDE 50 MG/1
TABLET, FILM COATED ORAL
Qty: 60 TABLET | Refills: 11 | Status: SHIPPED | OUTPATIENT
Start: 2019-04-17 | End: 2019-04-17

## 2019-04-17 SDOH — HEALTH STABILITY: MENTAL HEALTH: HOW MANY STANDARD DRINKS CONTAINING ALCOHOL DO YOU HAVE ON A TYPICAL DAY?: 1 OR 2

## 2019-04-17 SDOH — HEALTH STABILITY: MENTAL HEALTH: HOW OFTEN DO YOU HAVE 6 OR MORE DRINKS ON ONE OCCASION?: LESS THAN MONTHLY

## 2019-04-17 SDOH — HEALTH STABILITY: MENTAL HEALTH: HOW OFTEN DO YOU HAVE A DRINK CONTAINING ALCOHOL?: 2-3 TIMES A WEEK

## 2019-04-17 ASSESSMENT — MIFFLIN-ST. JEOR: SCORE: 1447.76

## 2019-04-17 NOTE — NURSING NOTE
Patient is here for a full physical exam. Healthcare directive given to patient today to fill out and turn back in when finished to have on file.     Pre-Visit Screening :  Immunizations : up to date  Colon Screening : is due and ordered today, patient has scheduled in 2 weeks  Mammogram: up to date   Asthma Action Test/Plan : ACT given today   PHQ9 :  PHQ-2 done today   GAD7 :  No concerns  Patient's  BMI There is no height or weight on file to calculate BMI.  Questioned patient about current smoking habits.  Pt. has never smoked.  OK to leave a detailed voice message regarding today's visit yes, phone # [phone]

## 2019-04-17 NOTE — PROGRESS NOTES
Chief Complaint: Kori Howard is an 53 year old woman who presents for preventive health visit.     Perimenopausal- menstrual periods every three months  Intermittent hot flashes  Vaginal dryness has improved- no longer using vaginal estrogen     Besides routine health maintenance,  she would like to discuss :.     Arthritis of hands- currently treated with:  Tumeric  chiro  Apple cider vinegar  Takes magnesium for leg cramps:    Chronic insomnia, hot flashes- will take gabapentin with trazodone- but not consistently    Healthy Habits:  Do you get at least three servings of calcium containing foods daily (dairy, green leafy vegetables, etc.)? yes    Vitamin D 2000IU winter and 1000 IU in summer  Calcium : takes 500 mg (occasional cheese, almond milk)  Outside of work or daily activities, how many days per week do you exercise for 30 minutes or longer?  Active lifestyle  Have you had an eye exam in the past two years? yes  Do you see a dentist twice per year? encouraged    PHQ-2  Over the last two weeks- Have you been bothered by little interest or pleasure in doing things?  No  Over the last two weeks- Have you been feeling down, depressed, or hopeless?  No      Advanced directive: counseled and encouraged       Social History     Tobacco Use     Smoking status: Never Smoker     Smokeless tobacco: Never Used   Substance Use Topics     Alcohol use: Yes     Alcohol/week: 0.0 oz     Comment: on occ       The patient does not drink >3 drinks per day nor >7 drinks per week.    Reviewed orders with patient.  Reviewed health maintenance and updated orders accordingly - Yes      History of abnormal Pap smear: NO - age 30- 65 PAP every 3 years recommended  NO - age 30-65 PAP every 5 years with negative HPV co-testing recommended  All Histories reviewed and updated in Epic.      ROS:   ROS: 10 point ROS neg other than the symptoms noted above in the HPI.  She is concerned about weight gain- drinking more alcohol-  "diet is less strict      OBJECTIVE:  /64 (BP Location: Left arm, Patient Position: Sitting, Cuff Size: Adult Regular)   Pulse 67   Temp 97.7  F (36.5  C) (Oral)   Ht 1.676 m (5' 6\")   Wt 82.1 kg (181 lb)   SpO2 98%   BMI 29.21 kg/m    General appearance: Healthy    Skin: Normal. No atypical appearing moles on inspection of trunk and extremities.    External ears  and canals clear bilaterally. TM's normal bilaterally. Nose normal without lesions or discharge. Oropharynx normal. Neck supple without palpable adenopathy.    Breasts are symmetric.  No dominant, discrete, fixed  or suspicious masses are noted.  No skin or nipple changes or axillary nodes.      Regular rate and  rhythm. S1 and S2 normal, no murmurs, clicks, gallops or rubs. No edema or JVD. Chest is clear; no wheezes or rales.    The abdomen is soft without tenderness, guarding, mass or organomegaly. Bowel sounds are normal. No CVA tenderness or inguinal adenopathy noted.    Pelvic:  deferred.    Rectal exam: deferred    Extremities: negative.      COUNSELING:  Reviewed preventive health counseling, as reflected in patient instructions  Special attention given to:        Regular exercise       Healthy diet/nutrition       Osteoporosis Prevention/Bone Health       Advance Care Planning    ATP III Guidelines  ICSI Preventive Guidelines    ASSESSMENT/PLAN:  (Z00.00) Routine history and physical examination of adult  (primary encounter diagnosis)  Comment:   Plan: VENOUS COLLECTION, Lipid Profile ([facility]),         CANCELED: HIV 1/2 Agn Mayte 4th Gen w Reflex         ([facility])            (R73.09) Abnormal glucose  Comment:   Plan: BASIC METABOLIC PANEL ([facility]), VENOUS         COLLECTION            (Z12.11) Special screening for malignant neoplasms, colon  Comment:   Plan: GASTROENTEROLOGY ADULT REF PROCEDURE ONLY         Other; [facility] [phone]            (N95.1) Menopausal syndrome (hot flashes)  Comment:   Plan: gabapentin (NEURONTIN) 300 MG " capsule,         DISCONTINUED: gabapentin (NEURONTIN) 300 MG         capsule           (F51.04) Chronic insomnia  Comment:   Plan: traZODone (DESYREL) 50 MG tablet, DISCONTINUED:        traZODone (DESYREL) 50 MG tablet            (B00.9) Herpes simplex virus infection  Comment:   Plan: valACYclovir (VALTREX) 1000 mg tablet

## 2019-04-18 LAB
BUN SERPL-MCNC: 14 MG/DL (ref 7–25)
BUN/CREATININE RATIO: NORMAL (CALC) (ref 6–22)
CALCIUM SERPL-MCNC: 9.7 MG/DL (ref 8.6–10.4)
CHLORIDE SERPLBLD-SCNC: 102 MMOL/L (ref 98–110)
CHOLEST SERPL-MCNC: 223 MG/DL
CHOLEST/HDLC SERPL: 2.4 (CALC)
CO2 SERPL-SCNC: 25 MMOL/L (ref 20–32)
CREAT SERPL-MCNC: 0.68 MG/DL (ref 0.5–1.05)
EGFR AFRICAN AMERICAN - QUEST: 117 ML/MIN/1.73M2
GFR SERPL CREATININE-BSD FRML MDRD: 101 ML/MIN/1.73M2
GLUCOSE - QUEST: 93 MG/DL (ref 65–99)
HDLC SERPL-MCNC: 94 MG/DL
LDLC SERPL CALC-MCNC: 115 MG/DL (CALC)
NONHDLC SERPL-MCNC: 129 MG/DL (CALC)
POTASSIUM SERPL-SCNC: 4.3 MMOL/L (ref 3.5–5.3)
SODIUM SERPL-SCNC: 137 MMOL/L (ref 135–146)
TRIGL SERPL-MCNC: 59 MG/DL

## 2019-04-18 ASSESSMENT — ASTHMA QUESTIONNAIRES: ACT_TOTALSCORE: 25

## 2019-04-30 ENCOUNTER — TRANSFERRED RECORDS (OUTPATIENT)
Dept: FAMILY MEDICINE | Facility: CLINIC | Age: 54
End: 2019-04-30

## 2019-09-29 ENCOUNTER — HEALTH MAINTENANCE LETTER (OUTPATIENT)
Age: 54
End: 2019-09-29

## 2019-10-27 ENCOUNTER — MYC REFILL (OUTPATIENT)
Dept: FAMILY MEDICINE | Facility: CLINIC | Age: 54
End: 2019-10-27

## 2019-10-27 DIAGNOSIS — N95.1 MENOPAUSAL SYNDROME (HOT FLASHES): ICD-10-CM

## 2019-10-27 DIAGNOSIS — F51.04 CHRONIC INSOMNIA: ICD-10-CM

## 2019-10-27 RX ORDER — GABAPENTIN 300 MG/1
300 CAPSULE ORAL AT BEDTIME
Qty: 90 CAPSULE | Refills: 3 | Status: CANCELLED | OUTPATIENT
Start: 2019-10-27

## 2019-10-28 ENCOUNTER — HEALTH MAINTENANCE LETTER (OUTPATIENT)
Age: 54
End: 2019-10-28

## 2019-10-28 RX ORDER — GABAPENTIN 300 MG/1
300 CAPSULE ORAL AT BEDTIME
Qty: 90 CAPSULE | Refills: 1 | Status: SHIPPED | OUTPATIENT
Start: 2019-10-28 | End: 2020-04-08

## 2019-10-28 RX ORDER — TRAZODONE HYDROCHLORIDE 50 MG/1
TABLET, FILM COATED ORAL
Qty: 180 TABLET | Refills: 1 | Status: SHIPPED | OUTPATIENT
Start: 2019-10-28 | End: 2020-03-31

## 2019-10-28 NOTE — TELEPHONE ENCOUNTER
Kori Howard is requesting a refill of:    Pending Prescriptions:                       Disp   Refills    traZODone (DESYREL) 50 MG tablet          180 ta*1            Sig: TAKE 2 TABLETS BY MOUTH EVERY NIGHT AT BEDTIME    gabapentin (NEURONTIN) 300 MG capsule     90 cap*1            Sig: Take 1 capsule (300 mg) by mouth At Bedtime    Please close encounter if RX was sent. Thanks, Angeline

## 2020-01-13 ENCOUNTER — MYC REFILL (OUTPATIENT)
Dept: FAMILY MEDICINE | Facility: CLINIC | Age: 55
End: 2020-01-13

## 2020-01-13 DIAGNOSIS — F51.04 CHRONIC INSOMNIA: ICD-10-CM

## 2020-01-13 DIAGNOSIS — N95.1 MENOPAUSAL SYNDROME (HOT FLASHES): ICD-10-CM

## 2020-01-13 RX ORDER — GABAPENTIN 300 MG/1
300 CAPSULE ORAL AT BEDTIME
Qty: 90 CAPSULE | Refills: 1 | Status: CANCELLED | OUTPATIENT
Start: 2020-01-13

## 2020-01-13 RX ORDER — TRAZODONE HYDROCHLORIDE 50 MG/1
TABLET, FILM COATED ORAL
Qty: 180 TABLET | Refills: 1 | Status: CANCELLED | OUTPATIENT
Start: 2020-01-13

## 2020-03-21 DIAGNOSIS — N95.1 MENOPAUSAL SYNDROME (HOT FLASHES): ICD-10-CM

## 2020-03-21 DIAGNOSIS — F51.04 CHRONIC INSOMNIA: ICD-10-CM

## 2020-03-23 RX ORDER — GABAPENTIN 300 MG/1
CAPSULE ORAL
Qty: 90 CAPSULE | Refills: 1 | COMMUNITY
Start: 2020-03-23

## 2020-03-23 RX ORDER — TRAZODONE HYDROCHLORIDE 50 MG/1
TABLET, FILM COATED ORAL
Qty: 180 TABLET | Refills: 1 | COMMUNITY
Start: 2020-03-23

## 2020-03-23 NOTE — TELEPHONE ENCOUNTER
Kori Howard is requesting a refill of:    Refused Prescriptions:                       Disp   Refills    traZODone (DESYREL) 50 MG tablet [Pharmacy*180 ta*1        Sig: TAKE 2 TABLETS BY MOUTH  EVERY NIGHT AT BEDTIME  Refused By: LILLIAN LOU  Reason for Refusal: Patient has requested refill too soon  Reason for Refusal Comment: Refilled on 10/28/19 for 180 with 1 refill    gabapentin (NEURONTIN) 300 MG capsule [Pha*90 cap*1        Sig: TAKE 1 CAPSULE BY MOUTH AT  BEDTIME  Refused By: LILLIAN LOU  Reason for Refusal: Patient has requested refill too soon  Reason for Refusal Comment: Refilled from 10/28/19 for 90 with 1 refill

## 2020-03-31 DIAGNOSIS — F51.04 CHRONIC INSOMNIA: ICD-10-CM

## 2020-03-31 RX ORDER — TRAZODONE HYDROCHLORIDE 50 MG/1
TABLET, FILM COATED ORAL
Qty: 180 TABLET | Refills: 0 | Status: SHIPPED | OUTPATIENT
Start: 2020-03-31 | End: 2020-04-08

## 2020-03-31 NOTE — TELEPHONE ENCOUNTER
Pending Prescriptions:                       Disp   Refills    traZODone (DESYREL) 50 MG tablet [Pharmac*180 ta*             Sig: TAKE 2 TABLETS BY MOUTH  EVERY NIGHT AT BEDTIME    This was a [facility] pt    THIS IS MAIL ORDER    Last ov was a year ago 4-2019 at px  Last refill was for 6 months on   Fax deny or advise  [place]

## 2020-03-31 NOTE — TELEPHONE ENCOUNTER
Call patient and inform her Dr Fleming is retired. I sent a 3 month refill but any further refills will need a visit.   Dr Fleming also filled gabapentin to take at bedtime for 6 months. She requested you return after 6 months to discuss this medication.  I have not refilled it. Are you taking it any more.

## 2020-04-08 ENCOUNTER — MYC REFILL (OUTPATIENT)
Dept: FAMILY MEDICINE | Facility: CLINIC | Age: 55
End: 2020-04-08

## 2020-04-08 DIAGNOSIS — F51.04 CHRONIC INSOMNIA: ICD-10-CM

## 2020-04-08 DIAGNOSIS — N95.1 MENOPAUSAL SYNDROME (HOT FLASHES): ICD-10-CM

## 2020-04-08 RX ORDER — GABAPENTIN 300 MG/1
300 CAPSULE ORAL AT BEDTIME
Qty: 30 CAPSULE | Refills: 0 | Status: SHIPPED | OUTPATIENT
Start: 2020-04-08 | End: 2020-06-16

## 2020-04-08 RX ORDER — GABAPENTIN 300 MG/1
300 CAPSULE ORAL AT BEDTIME
Qty: 30 CAPSULE | Refills: 0 | Status: CANCELLED | OUTPATIENT
Start: 2020-04-08

## 2020-04-08 RX ORDER — TRAZODONE HYDROCHLORIDE 50 MG/1
TABLET, FILM COATED ORAL
Qty: 60 TABLET | Refills: 0 | Status: SHIPPED | OUTPATIENT
Start: 2020-04-08 | End: 2020-06-16

## 2020-04-08 NOTE — TELEPHONE ENCOUNTER
Pt has CPX 05/12/20 with [facility]. She will be 30 days short and wishes this to go to her mail order. Please advise.    Kori Howard is requesting a refill of:    Pending Prescriptions:                       Disp   Refills    traZODone (DESYREL) 50 MG tablet          60 tab*0          gabapentin (NEURONTIN) 300 MG capsule     30 cap*0            Sig: Take 1 capsule (300 mg) by mouth At Bedtime

## 2020-06-16 ENCOUNTER — OFFICE VISIT (OUTPATIENT)
Dept: FAMILY MEDICINE | Facility: CLINIC | Age: 55
End: 2020-06-16

## 2020-06-16 VITALS
WEIGHT: 186.4 LBS | HEART RATE: 69 BPM | OXYGEN SATURATION: 97 % | DIASTOLIC BLOOD PRESSURE: 76 MMHG | BODY MASS INDEX: 29.96 KG/M2 | RESPIRATION RATE: 18 BRPM | TEMPERATURE: 98.1 F | SYSTOLIC BLOOD PRESSURE: 114 MMHG | HEIGHT: 66 IN

## 2020-06-16 DIAGNOSIS — Z13.220 SCREENING CHOLESTEROL LEVEL: ICD-10-CM

## 2020-06-16 DIAGNOSIS — N95.1 MENOPAUSAL SYNDROME (HOT FLASHES): ICD-10-CM

## 2020-06-16 DIAGNOSIS — R39.15 URGENCY OF URINATION: ICD-10-CM

## 2020-06-16 DIAGNOSIS — F51.04 CHRONIC INSOMNIA: ICD-10-CM

## 2020-06-16 DIAGNOSIS — B00.9 HERPES SIMPLEX VIRUS INFECTION: ICD-10-CM

## 2020-06-16 DIAGNOSIS — Z00.00 ROUTINE HISTORY AND PHYSICAL EXAMINATION OF ADULT: Primary | ICD-10-CM

## 2020-06-16 DIAGNOSIS — Z11.51 SPECIAL SCREENING EXAMINATION FOR HUMAN PAPILLOMAVIRUS (HPV): ICD-10-CM

## 2020-06-16 DIAGNOSIS — Z13.1 SCREENING FOR DIABETES MELLITUS: ICD-10-CM

## 2020-06-16 LAB
% GRANULOCYTES: 54.5 %
ALBUMIN (URINE): NORMAL MG/DL
APPEARANCE UR: CLEAR
BACTERIA, UR: NORMAL
BILIRUB UR QL: NORMAL
CASTS/LPF: NORMAL
CHOLEST SERPL-MCNC: 222 MG/DL (ref 0–199)
CHOLEST/HDLC SERPL: 2 {RATIO} (ref 0–5)
COLOR UR: YELLOW
EP/HPF: NORMAL
GLUCOSE SERPL-MCNC: 90 MG/DL (ref 60–99)
GLUCOSE URINE: NORMAL MG/DL
HCT VFR BLD AUTO: 43 % (ref 35–47)
HDLC SERPL-MCNC: 103 MG/DL (ref 40–150)
HEMOGLOBIN: 14.3 G/DL (ref 11.7–15.7)
HGB UR QL: NORMAL
KETONES UR QL: NORMAL MG/DL
LDLC SERPL CALC-MCNC: 108 MG/DL (ref 0–130)
LEUKOCYTE ESTERASE - QUEST: NORMAL
LYMPHOCYTES NFR BLD AUTO: 35.7 %
MAMMOGRAM: NORMAL
MCH RBC QN AUTO: 30 PG (ref 26–33)
MCHC RBC AUTO-ENTMCNC: 33.3 G/DL (ref 31–36)
MCV RBC AUTO: 90.4 FL (ref 78–100)
MISC.: NORMAL
MONOCYTES NFR BLD AUTO: 9.8 %
NITRITE UR QL STRIP: NORMAL
PH UR STRIP: 7 PH (ref 5–7)
PLATELET COUNT - QUEST: 292 10^9/L (ref 150–375)
RBC # BLD AUTO: 4.76 10*12/L (ref 3.8–5.2)
RBC, UR MICRO: NORMAL (ref ?–2)
SP. GRAVITY: 1.01
TRIGL SERPL-MCNC: 56 MG/DL (ref 0–149)
UROBILINOGEN UR QL STRIP: 0.2 EU/DL (ref 0.2–1)
WBC # BLD AUTO: 5.7 10*9/L (ref 4–11)
WBC, UR MICRO: NORMAL (ref ?–2)

## 2020-06-16 PROCEDURE — 36415 COLL VENOUS BLD VENIPUNCTURE: CPT | Performed by: FAMILY MEDICINE

## 2020-06-16 PROCEDURE — 90471 IMMUNIZATION ADMIN: CPT | Performed by: FAMILY MEDICINE

## 2020-06-16 PROCEDURE — 80061 LIPID PANEL: CPT | Performed by: FAMILY MEDICINE

## 2020-06-16 PROCEDURE — 99396 PREV VISIT EST AGE 40-64: CPT | Mod: 25 | Performed by: FAMILY MEDICINE

## 2020-06-16 PROCEDURE — 90714 TD VACC NO PRESV 7 YRS+ IM: CPT | Performed by: FAMILY MEDICINE

## 2020-06-16 PROCEDURE — 82947 ASSAY GLUCOSE BLOOD QUANT: CPT | Performed by: FAMILY MEDICINE

## 2020-06-16 PROCEDURE — 81001 URINALYSIS AUTO W/SCOPE: CPT | Performed by: FAMILY MEDICINE

## 2020-06-16 PROCEDURE — 85025 COMPLETE CBC W/AUTO DIFF WBC: CPT | Performed by: FAMILY MEDICINE

## 2020-06-16 RX ORDER — VALACYCLOVIR HYDROCHLORIDE 1 G/1
TABLET, FILM COATED ORAL
Qty: 12 TABLET | Refills: 3 | Status: SHIPPED | OUTPATIENT
Start: 2020-06-16 | End: 2021-07-21

## 2020-06-16 RX ORDER — TRAZODONE HYDROCHLORIDE 50 MG/1
50 TABLET, FILM COATED ORAL AT BEDTIME
Qty: 90 TABLET | Refills: 3 | Status: SHIPPED | OUTPATIENT
Start: 2020-06-16 | End: 2021-07-21

## 2020-06-16 SDOH — ECONOMIC STABILITY: TRANSPORTATION INSECURITY
IN THE PAST 12 MONTHS, HAS THE LACK OF TRANSPORTATION KEPT YOU FROM MEDICAL APPOINTMENTS OR FROM GETTING MEDICATIONS?: NO

## 2020-06-16 SDOH — ECONOMIC STABILITY: FOOD INSECURITY: WITHIN THE PAST 12 MONTHS, YOU WORRIED THAT YOUR FOOD WOULD RUN OUT BEFORE YOU GOT MONEY TO BUY MORE.: NEVER TRUE

## 2020-06-16 SDOH — ECONOMIC STABILITY: TRANSPORTATION INSECURITY
IN THE PAST 12 MONTHS, HAS LACK OF TRANSPORTATION KEPT YOU FROM MEETINGS, WORK, OR FROM GETTING THINGS NEEDED FOR DAILY LIVING?: NO

## 2020-06-16 SDOH — ECONOMIC STABILITY: FOOD INSECURITY: WITHIN THE PAST 12 MONTHS, THE FOOD YOU BOUGHT JUST DIDN'T LAST AND YOU DIDN'T HAVE MONEY TO GET MORE.: NEVER TRUE

## 2020-06-16 SDOH — ECONOMIC STABILITY: INCOME INSECURITY: HOW HARD IS IT FOR YOU TO PAY FOR THE VERY BASICS LIKE FOOD, HOUSING, MEDICAL CARE, AND HEATING?: NOT HARD AT ALL

## 2020-06-16 ASSESSMENT — MIFFLIN-ST. JEOR: SCORE: 1462.25

## 2020-06-16 NOTE — PATIENT INSTRUCTIONS
Exercise encouraged  Fasting lipid profile obtained  Follow up in 1 year  High fiber, low fat diet encouraged  Mammogram recommended  PAP smear obtained  Refills given  Seat belt use encouraged  Sunscreen recommended  Tetanus booster given  Weight loss recommended

## 2020-06-16 NOTE — PROGRESS NOTES
SUBJECTIVE:  Kori Howard is an 55 year old  postmenopausal woman   who presents for annual gyn exam. Menopause at age 55 this year. No   bleeding, spotting, or discharge noted.     Estrogen replacement therapy: none currently, has taken in past  RODRÍGUEZ exposure: no  History of abnormal Pap smear: Yes: LEEP / WNL since. Had an ablation GYN  Family history of uterine or ovarian cancer: No  Regular self breast exam: Yes  History of abnormal mammogram: No  Family history of breast cancer: Yes: maternal great aunt  History of abnormal lipids: No    Past Medical History:  No date: Family history of diabetes mellitus    Review of patient's family history indicates:  Problem: Cancer      Relation: No family hx of          Age of Onset: (Not Specified)  Problem: Diabetes      Relation: Father          Age of Onset: (Not Specified)  Problem: Heart Disease      Relation: Father          Age of Onset: (Not Specified)  Problem: Hypertension      Relation: Father          Age of Onset: (Not Specified)  Problem: Heart Disease      Relation: Mother          Age of Onset: (Not Specified)      Past Surgical History:  No date: C JORDAN W/O FACETEC FORAMOT/DSKC  VRT SEG, LUMBAR  : C NONSPECIFIC PROCEDURE      Comment:  LEEP  2009: COLONOSCOPY      Comment:  NORMAL/ REPEAT IN 10 years  2009: HC COLONOSCOPY THRU STOMA, DIAGNOSTIC      Comment:  normal/ Stone/ repeat in 10 years  No date: HYSTEROSCOPY,ABLATION ENDOMETRIUM    Current Outpatient Medications:  Calcium 150 MG TABS  cetirizine HCl (ZYRTEC ALLERGY) 10 MG CAPS  Cholecalciferol (VITAMIN D3) 3000 UNITS TABS  cyanocobalamin (VITAMIN  B-12) 1000 MCG tablet  Emollient (COLLAGEN EX)  FISH OIL 1000 MG OR CAPS  Flaxseed, Linseed, (FLAX SEED OIL) 1000 MG capsule  gabapentin (NEURONTIN) 300 MG capsule  magnesium 100 MG CAPS  MULTIVITAMINS OR  Probiotic Product (PROBIOTIC ADVANCED PO)  traZODone (DESYREL) 50 MG tablet  valACYclovir (VALTREX) 1000 mg  "tablet    No current facility-administered medications for this visit.      -- Codeine    -- Oxaprozin     --  daypro   -- Oxycodone     --  vomiting    Social History    Tobacco Use      Smoking status: Never Smoker      Smokeless tobacco: Never Used    Alcohol use: Yes      Alcohol/week: 0.0 standard drinks      Frequency: 2-3 times a week      Drinks per session: 1 or 2      Binge frequency: Less than monthly      Comment: on occ      Review Of Systems  Ears/Nose/Throat: allergies all year around/ recurrent herpes face using prn Valtrex  Respiratory: No shortness of breath, dyspnea on exertion, cough, or hemoptysis  Cardiovascular: negative  Gastrointestinal: negative and 2019 colonoscopy  Genitourinary: urgency/ bladder lift surgery in the past/ seems like it has failed  CNS: menopausal symptoms with insomnia/ trazodone works well    OBJECTIVE:  /76 (BP Location: Right arm, Patient Position: Sitting, Cuff Size: Adult Large)   Pulse 69   Temp 98.1  F (36.7  C) (Oral)   Resp 18   Ht 1.676 m (5' 6\")   Wt 84.6 kg (186 lb 6.4 oz)   LMP 10/16/2019   SpO2 97%   BMI 30.09 kg/m    General appearance: healthy, alert, no distress, cooperative, smiling and over weight  Skin: Skin color, texture, turgor normal. No rashes or lesions.  Ears: negative  Nose/Sinuses: Nares normal. Septum midline. Mucosa normal. No drainage or sinus tenderness.  Oropharynx: Lips, mucosa, and tongue normal. Teeth and gums normal.  Neck: Neck supple. No adenopathy. Thyroid symmetric, normal size,, Carotids without bruits.  Lungs: negative, Percussion normal. Good diaphragmatic excursion. Lungs clear  Heart: negative, PMI normal. No lifts, heaves, or thrills. RRR. No murmurs, clicks gallops or rub  Breasts: Inspection negative. No nipple discharge or bleeding. No masses.  Abdomen: Abdomen soft, non-tender. BS normal. No masses, organomegaly  Pelvic: External genitalia and vagina normal. Bimanual and rectovaginal normal., positive " findings:  vaginal mucosa atrophy  BMI : Body mass index is 30.09 kg/m .    ASSESSMENT:(Z00.00) Routine history and physical examination of adult  (primary encounter diagnosis)  Plan: CL AFF HEMOGRAM/PLATE/DIFF (BFP), VENOUS         COLLECTION        Exercise encouraged  Fasting lipid profile obtained  Follow up in 1 year  High fiber, low fat diet encouraged  Mammogram recommended  PAP smear obtained  Refills given  Seat belt use encouraged  Sunscreen recommended  Tetanus booster given  Weight loss recommended      (N95.1) Menopausal syndrome (hot flashes)  Plan: Probiotic Product (PROBIOTIC ADVANCED PO),         Emollient (COLLAGEN EX)        Read handout/ I have reviewed the patient's medical history in detail and updated the computerized patient record.      (B00.9) Herpes simplex virus infection  Plan: valACYclovir (VALTREX) 1000 mg tablet        refilled    (F51.04) Chronic insomnia  Comment: options reviewed  Plan: CL AFF HEMOGRAM/PLATE/DIFF (BFP), VENOUS         COLLECTION, traZODone (DESYREL) 50 MG tablet        Potential medication side effects were discussed with the patient; let me know if any occur.      (R39.15) Urgency of urination  Plan: HCL  Urinalysis, Routine (BFP), UROLOGY ADULT         REFERRAL        Back to specialists    (Z11.51) Special screening examination for human papillomavirus (HPV)  Plan: ThinPrep Pap and HPV (mRNA E6/E7)         ([facility])            (Z13.220) Screening cholesterol level  Plan: Lipid Panel (BFP), VENOUS COLLECTION            (Z13.1) Screening for diabetes mellitus  Plan: Glucose Fasting (BFP), VENOUS COLLECTION                Dx:  1)  Pap smear, mammogram  2)  Lipids at appropriate intervals    PE:  Reviewed health maintenance including diet, regular exercise,   estrogen replacement and periodic exams.

## 2020-06-17 ENCOUNTER — TELEPHONE (OUTPATIENT)
Dept: UROLOGY | Facility: CLINIC | Age: 55
End: 2020-06-17

## 2020-06-17 NOTE — TELEPHONE ENCOUNTER
[facility] Call Center    Phone Message    May a detailed message be left on voicemail: yes     Reason for Call: Other: Pt is referred by Dr Edison Gutierrez at  to [facility] for urinary urgency. Per scheduling guidelines, review is needed. Records and referral in [facility], thanks!     Action Taken: Message routed to: [facility]     Travel Screening: Not Applicable

## 2020-06-19 LAB
CLINICAL HISTORY - QUEST: NORMAL
COMMENT - QUEST: NORMAL
CYTOTECHNOLOGIST - QUEST: NORMAL
DESCRIPTIVE DIAGNOSIS - QUEST: NORMAL
LAST PAP DX - QUEST: NORMAL
LMP - QUEST: NORMAL
PREV BX DX - QUEST: NORMAL
SOURCE: NORMAL
STATEMENT OF ADEQUACY - QUEST: NORMAL

## 2020-07-20 ENCOUNTER — TELEPHONE (OUTPATIENT)
Dept: FAMILY MEDICINE | Facility: CLINIC | Age: 55
End: 2020-07-20

## 2021-01-14 ENCOUNTER — HEALTH MAINTENANCE LETTER (OUTPATIENT)
Age: 56
End: 2021-01-14

## 2021-03-08 ENCOUNTER — OFFICE VISIT (OUTPATIENT)
Dept: FAMILY MEDICINE | Facility: CLINIC | Age: 56
End: 2021-03-08

## 2021-03-08 VITALS
SYSTOLIC BLOOD PRESSURE: 136 MMHG | BODY MASS INDEX: 30.31 KG/M2 | TEMPERATURE: 98.7 F | RESPIRATION RATE: 20 BRPM | HEIGHT: 66 IN | WEIGHT: 188.6 LBS | DIASTOLIC BLOOD PRESSURE: 78 MMHG | HEART RATE: 70 BPM | OXYGEN SATURATION: 98 %

## 2021-03-08 DIAGNOSIS — N95.2 ATROPHIC VAGINITIS: ICD-10-CM

## 2021-03-08 DIAGNOSIS — F41.1 GAD (GENERALIZED ANXIETY DISORDER): ICD-10-CM

## 2021-03-08 DIAGNOSIS — R00.2 PALPITATIONS: Primary | ICD-10-CM

## 2021-03-08 DIAGNOSIS — R03.0 ELEVATED BLOOD-PRESSURE READING WITHOUT DIAGNOSIS OF HYPERTENSION: ICD-10-CM

## 2021-03-08 LAB
% GRANULOCYTES: 60.8 %
ALBUMIN SERPL-MCNC: 4.4 G/DL (ref 3.6–5.1)
ALBUMIN/GLOB SERPL: 1.8 {RATIO} (ref 1–2.5)
ALP SERPL-CCNC: 67 U/L (ref 33–130)
ALT 1742-6: 16 U/L (ref 0–32)
AST 1920-8: 17 U/L (ref 0–35)
BILIRUB SERPL-MCNC: 0.7 MG/DL (ref 0.2–1.2)
BUN SERPL-MCNC: 17 MG/DL (ref 7–25)
BUN/CREATININE RATIO: 21.3 (ref 6–22)
CALCIUM SERPL-MCNC: 9.9 MG/DL (ref 8.6–10.3)
CHLORIDE SERPLBLD-SCNC: 104.5 MMOL/L (ref 98–110)
CO2 SERPL-SCNC: 26.2 MMOL/L (ref 20–32)
CREAT SERPL-MCNC: 0.8 MG/DL (ref 0.6–1.3)
GLOBULIN, CALCULATED - QUEST: 2.4 (ref 1.9–3.7)
GLUCOSE SERPL-MCNC: 99 MG/DL (ref 60–99)
HBA1C MFR BLD: 5.6 % (ref 4–7)
HCT VFR BLD AUTO: 44.7 % (ref 35–47)
HEMOGLOBIN: 13.9 G/DL (ref 11.7–15.7)
LYMPHOCYTES NFR BLD AUTO: 32.5 %
MCH RBC QN AUTO: 28.3 PG (ref 26–33)
MCHC RBC AUTO-ENTMCNC: 31.1 G/DL (ref 31–36)
MCV RBC AUTO: 90.9 FL (ref 78–100)
MONOCYTES NFR BLD AUTO: 6.7 %
PLATELET COUNT - QUEST: 306 10^9/L (ref 150–375)
POTASSIUM SERPL-SCNC: 4.54 MMOL/L (ref 3.5–5.3)
PROT SERPL-MCNC: 6.8 G/DL (ref 6.1–8.1)
RBC # BLD AUTO: 4.92 10*12/L (ref 3.8–5.2)
SODIUM SERPL-SCNC: 139.6 MMOL/L (ref 135–146)
WBC # BLD AUTO: 8 10*9/L (ref 4–11)

## 2021-03-08 PROCEDURE — 83036 HEMOGLOBIN GLYCOSYLATED A1C: CPT | Performed by: FAMILY MEDICINE

## 2021-03-08 PROCEDURE — 36415 COLL VENOUS BLD VENIPUNCTURE: CPT | Performed by: FAMILY MEDICINE

## 2021-03-08 PROCEDURE — 93000 ELECTROCARDIOGRAM COMPLETE: CPT | Performed by: FAMILY MEDICINE

## 2021-03-08 PROCEDURE — 85025 COMPLETE CBC W/AUTO DIFF WBC: CPT | Performed by: FAMILY MEDICINE

## 2021-03-08 PROCEDURE — 80053 COMPREHEN METABOLIC PANEL: CPT | Performed by: FAMILY MEDICINE

## 2021-03-08 PROCEDURE — 99215 OFFICE O/P EST HI 40 MIN: CPT | Mod: 25 | Performed by: FAMILY MEDICINE

## 2021-03-08 ASSESSMENT — MIFFLIN-ST. JEOR: SCORE: 1472.23

## 2021-03-08 NOTE — NURSING NOTE
Kori is here today to discuss high BP readings at home along with heart palpitations. Would like a refill of estradiol cream.    Pre-visit Screening:  Immunizations:  up to date  Colonoscopy:  is up to date  Mammogram: is up to date  Asthma Action Test/Plan:  PILLO  PHQ9:  PILLO  GAD7:  PILLO  Questioned patient about current smoking habits Pt. has never smoked.  Ok to leave detailed message on voice mail for today's visit only Yes, phone # [phone]

## 2021-03-08 NOTE — PROGRESS NOTES
SUBJECTIVE:  Kori Howard is an 55 year old female who presents for evaluation of hypertension noted at home and feeling palpitations/pounding heart in episodes. Starts exercising and feels like it is pounding. Can be sitting and it can start pounding. Episodes last less than 15 minutes and are a surprise when they ioccur.  Patient denies exertional chest pain, dyspnea, palpitations, syncope, orthopnea, edema or paroxysmal nocturnal dyspnea.       Since coronavirus has been full of anxiety daily / working out 5 days a week for control.  Current medication regimen is as listed   below.     Needs vaginal estrogen refill/ cost is an issue and wonders about another plan    Family history: positive for hypertension, diabetes mellitus and cardiovascular disease  Age at onset of elevated blood pressure: just the last 2 weeks with home monitor  Cardiovascular risk factors: family history, obesity, sedentary life style, stress and anxiety disorder/ menopause  Use of agents associated with hypertension: none  History of renal disease: negative  History of flank trauma: negative     ROS: 10 point ROS neg other than the symptoms noted above in the HPI.      Current Outpatient Medications   Medication     Calcium 150 MG TABS     cetirizine HCl (ZYRTEC ALLERGY) 10 MG CAPS     Cholecalciferol (VITAMIN D3) 3000 UNITS TABS     cyanocobalamin (VITAMIN  B-12) 1000 MCG tablet     Emollient (COLLAGEN EX)     ESTRADIOL 0.1MG/GM CREAM     FISH OIL 1000 MG OR CAPS     Flaxseed, Linseed, (FLAX SEED OIL) 1000 MG capsule     magnesium 100 MG CAPS     MULTIVITAMINS OR     Probiotic Product (PROBIOTIC ADVANCED PO)     traZODone (DESYREL) 50 MG tablet     valACYclovir (VALTREX) 1000 mg tablet     No current facility-administered medications for this visit.      Allergies   Allergen Reactions     Codeine      Oxaprozin      daypro     Oxycodone      vomiting       Social History     Tobacco Use     Smoking status: Never Smoker      "Smokeless tobacco: Never Used   Substance Use Topics     Alcohol use: Yes     Alcohol/week: 0.0 standard drinks     Frequency: 2-3 times a week     Drinks per session: 1 or 2     Binge frequency: Less than monthly     Comment: on occ       OBJECTIVE:  /78 (BP Location: Right arm, Patient Position: Sitting, Cuff Size: Adult Large)   Pulse 70   Temp 98.7  F (37.1  C) (Oral)   Resp 20   Ht 1.676 m (5' 6\")   Wt 85.5 kg (188 lb 9.6 oz)   SpO2 98%   BMI 30.44 kg/m    Repeat BP R arm seated = 136/78 with large size cuff.  Fundi: deferred  Thyroid: normal to inspection and palpation  Lungs: negative, Percussion normal. Good diaphragmatic excursion. Lungs clear  Heart: negative, PMI normal. No lifts, heaves, or thrills. RRR. No murmurs, clicks gallops or rub  Peripheral pulses: radial=4/4, femoral=4/4, popliteal=4/4, dorsalis pedis=4/4,  Abd; The abdomen is soft without tenderness, guarding, mass or organomegaly. Bowel sounds are normal. No CVA tenderness or inguinal adenopathy noted.  Ext: The lower extremities are normal and reveal no sign of DVT. Calves and thighs are soft and non tender, color is normal, no swelling or redness. Ok's sign is negative.  Pedal pulses are normal.    CBC; WNL  A1C; WNL  EKG: sinus rhythm without any changes    ASSESSMENT:(R00.2) Palpitations  (primary encounter diagnosis)  Comment: read handout/ await labs  Plan: HEMOGRAM PLATELET DIFF (BFP), VENOUS         COLLECTION, TSH with free T4 reflex ([facility]),         Comprehensive Metobolic Panel (BFP), Hemoglobin        A1c (BFP), EKG 12-lead complete w/read -         Clinics, Leadless EKG Monitor 3 to 7 Days        Schedule monitor and await response    (F41.1) RIVKA (generalized anxiety disorder)  Comment: diagnosis discussed/ consider therapy  Plan: VENOUS COLLECTION, TSH with free T4 reflex         ([facility]), Comprehensive Metobolic Panel (BFP)            (R03.0) Elevated blood-pressure reading without diagnosis of " hypertension  Plan: HEMOGRAM PLATELET DIFF (BFP), VENOUS         COLLECTION, TSH with free T4 reflex ([facility]),         Comprehensive Metobolic Panel (BFP), EKG         12-lead complete w/read - Clinics            (N95.2) Atrophic vaginitis  Comment: options  Plan: ESTRADIOL 0.1MG/GM CREAM, COMPOUNDED         NON-CONTROLLED SUBSTANCE (CMPD RX) - PHARMACY         TO MIX COMPOUNDED MEDICATION        Try [facility] compounding pharmacy for better costs/ call if any issues.    Total time spent with patient today including visit and non face to face time 40 minutes.

## 2021-03-09 LAB — TSH SERPL-ACNC: 1.67 MIU/L

## 2021-03-11 NOTE — PATIENT INSTRUCTIONS
Await labs    Schedule cardiology monitoring  Any worrisome symptoms to ER for immediate evaluation

## 2021-03-20 ENCOUNTER — MYC MEDICAL ADVICE (OUTPATIENT)
Dept: FAMILY MEDICINE | Facility: CLINIC | Age: 56
End: 2021-03-20

## 2021-04-08 DIAGNOSIS — F51.04 CHRONIC INSOMNIA: ICD-10-CM

## 2021-04-08 DIAGNOSIS — B00.9 HERPES SIMPLEX VIRUS INFECTION: ICD-10-CM

## 2021-04-09 RX ORDER — VALACYCLOVIR HYDROCHLORIDE 1 G/1
TABLET, FILM COATED ORAL
Qty: 12 TABLET | Refills: 3 | COMMUNITY
Start: 2021-04-09

## 2021-04-09 RX ORDER — TRAZODONE HYDROCHLORIDE 50 MG/1
TABLET, FILM COATED ORAL
Qty: 90 TABLET | Refills: 3 | COMMUNITY
Start: 2021-04-09

## 2021-04-09 NOTE — TELEPHONE ENCOUNTER
Received incoming refill request for  Pending Prescriptions:                       Disp   Refills    traZODone (DESYREL) 50 MG tablet [Pharmac*90 tab*3            Sig: TAKE 1 TABLET BY MOUTH AT  BEDTIME    valACYclovir (VALTREX) 1000 mg tablet [Ph*12 tab*3            Sig: TAKE 2 TABLETS WITH ONSET  OF SYMPTOMS: REPEAT           DOSE IN 12 HOURS    Patient last had a refill of these medications on 6/16/20 so no refill should be needed at this time. Trazodone was filled for a year.

## 2021-07-21 ENCOUNTER — OFFICE VISIT (OUTPATIENT)
Dept: FAMILY MEDICINE | Facility: CLINIC | Age: 56
End: 2021-07-21

## 2021-07-21 VITALS
WEIGHT: 189 LBS | BODY MASS INDEX: 30.37 KG/M2 | SYSTOLIC BLOOD PRESSURE: 114 MMHG | DIASTOLIC BLOOD PRESSURE: 72 MMHG | HEART RATE: 65 BPM | TEMPERATURE: 97.4 F | HEIGHT: 66 IN | OXYGEN SATURATION: 98 %

## 2021-07-21 DIAGNOSIS — Z13.220 SCREENING FOR LIPID DISORDERS: ICD-10-CM

## 2021-07-21 DIAGNOSIS — R39.9 SYMPTOM INVOLVING BLADDER: ICD-10-CM

## 2021-07-21 DIAGNOSIS — Z01.419 ENCOUNTER FOR GYNECOLOGICAL EXAMINATION: Primary | ICD-10-CM

## 2021-07-21 DIAGNOSIS — Z12.4 SCREENING FOR CERVICAL CANCER: ICD-10-CM

## 2021-07-21 DIAGNOSIS — Z13.1 SCREENING FOR DIABETES MELLITUS: ICD-10-CM

## 2021-07-21 DIAGNOSIS — Z13.228 SCREENING FOR METABOLIC DISORDER: ICD-10-CM

## 2021-07-21 DIAGNOSIS — N95.2 ATROPHIC VAGINITIS: ICD-10-CM

## 2021-07-21 DIAGNOSIS — F51.04 CHRONIC INSOMNIA: ICD-10-CM

## 2021-07-21 DIAGNOSIS — B00.9 HERPES SIMPLEX VIRUS INFECTION: ICD-10-CM

## 2021-07-21 DIAGNOSIS — Z98.890 HISTORY OF BLADDER SURGERY: ICD-10-CM

## 2021-07-21 LAB
CHOLEST SERPL-MCNC: 223 MG/DL (ref 0–199)
CHOLEST/HDLC SERPL: 2 {RATIO} (ref 0–5)
GLUCOSE SERPL-MCNC: 105 MG/DL (ref 60–99)
HDLC SERPL-MCNC: 103 MG/DL (ref 40–150)
LDLC SERPL CALC-MCNC: 108 MG/DL (ref 0–130)
TRIGL SERPL-MCNC: 58 MG/DL (ref 0–149)

## 2021-07-21 PROCEDURE — 36415 COLL VENOUS BLD VENIPUNCTURE: CPT | Performed by: PHYSICIAN ASSISTANT

## 2021-07-21 PROCEDURE — 99396 PREV VISIT EST AGE 40-64: CPT | Performed by: PHYSICIAN ASSISTANT

## 2021-07-21 PROCEDURE — 82947 ASSAY GLUCOSE BLOOD QUANT: CPT | Performed by: PHYSICIAN ASSISTANT

## 2021-07-21 PROCEDURE — 80061 LIPID PANEL: CPT | Performed by: PHYSICIAN ASSISTANT

## 2021-07-21 RX ORDER — VALACYCLOVIR HYDROCHLORIDE 1 G/1
TABLET, FILM COATED ORAL
Qty: 12 TABLET | Refills: 3 | Status: SHIPPED | OUTPATIENT
Start: 2021-07-21 | End: 2022-07-25

## 2021-07-21 RX ORDER — TRAZODONE HYDROCHLORIDE 50 MG/1
50 TABLET, FILM COATED ORAL AT BEDTIME
Qty: 90 TABLET | Refills: 3 | Status: SHIPPED | OUTPATIENT
Start: 2021-07-21 | End: 2022-07-01

## 2021-07-21 ASSESSMENT — MIFFLIN-ST. JEOR: SCORE: 1469.05

## 2021-07-21 NOTE — NURSING NOTE
Chief Complaint   Patient presents with     Physical     fasting         Pre-visit Screening:  Immunizations:  up to date  Colonoscopy:  is up to date  Mammogram: is up to date  Asthma Action Test/Plan:  NA  PHQ9:  NA  GAD7:  NA  Questioned patient about current smoking habits Pt. has never smoked.  Ok to leave detailed message on voice mail for today's visit only Yes, phone # cell

## 2021-07-21 NOTE — PROGRESS NOTES
Chief Complaint:  Physical Exam    SUBJECTIVE:   Kori Howard is a 56 year old female presents for routine health maintenance.    Current concerns:    HSV:  Gets HSV near right eye on occasion. Has happened 1 times in the past 1 year. No side effects.    Insomnia:  Takes trazodone 50 mg daily at bedtime to help with sleep, hot flashes. Works most of the time. Does feel like it is anxiety related. Going to the gym, working with nutritionalist, cutting down on caffeine. May eventually return for anxiety/stress focused visit.     Urinary pain:  History of bladder lift surgery 2009. Has not seen urology since around that time. Feels that she needs to go back to urology for further work-up on urinary pain, tension.     Menses are absent    Patient's last menstrual period was 10/01/2019.     Was last Pap smear normal: Yes  Due for mammogram:  Yes    Body mass index is 30.51 kg/m .    Present exercise habits:  3-5 times/week  Present dietary habits:  eats regular meals and follows a balanced nutrition diet    Calcium intake: 3-4 servings daily  Vit D intake: is taking supplement    Is the patient a smoker? No  If yes, smoking cessation advised and counseling provided.     Cardiovascular risk factors: none    Over the past few weeks, have you felt down or depressed? Little interest or pleasure in doing things? No concerns    If in a relationship are there any Domestic violence concern: No    Last dental appointment:  this year  Last optical appointment:  this year    Was the patient born between [id number] and has not had Hep C testing?  No, not applicable    I have reviewed the following histories: Past Medical History, Past Surgical History, Social History, Family History, Problem List, Medication List and Allergies    Past Medical History:   Diagnosis Date     Family history of diabetes mellitus      Family History   Problem Relation Age of Onset     Heart Disease Mother      Diabetes Father      Heart  Disease Father      Hypertension Father      Cancer No family hx of      Social History     Socioeconomic History     Marital status:      Spouse name: Armen     Number of children: 2     Years of education: 12     Highest education level: Not on file   Occupational History     Occupation:      Comment: Self   Tobacco Use     Smoking status: Never Smoker     Smokeless tobacco: Never Used   Substance and Sexual Activity     Alcohol use: Yes     Alcohol/week: 0.0 standard drinks     Comment: on occ     Drug use: No     Sexual activity: Yes     Partners: Male     Birth control/protection: Surgical   Other Topics Concern      Service Not Asked     Blood Transfusions Not Asked     Caffeine Concern Not Asked     Occupational Exposure Not Asked     Hobby Hazards Not Asked     Sleep Concern Not Asked     Stress Concern Not Asked     Weight Concern Not Asked     Special Diet Not Asked     Back Care Not Asked     Exercise Yes     Bike Helmet Not Asked     Seat Belt Yes     Self-Exams No     Parent/sibling w/ CABG, MI or angioplasty before 65F 55M? Not Asked   Social History Narrative     Not on file     Social Determinants of Health     Financial Resource Strain: Low Risk      Difficulty of Paying Living Expenses: Not hard at all   Food Insecurity: No Food Insecurity     Worried About Running Out of Food in the Last Year: Never true     Ran Out of Food in the Last Year: Never true   Transportation Needs: No Transportation Needs     Lack of Transportation (Medical): No     Lack of Transportation (Non-Medical): No   Physical Activity:      Days of Exercise per Week:      Minutes of Exercise per Session:    Stress:      Feeling of Stress :    Social Connections:      Frequency of Communication with Friends and Family:      Frequency of Social Gatherings with Friends and Family:      Attends Islam Services:      Active Member of Clubs or Organizations:      Attends Club or Organization Meetings:       Marital Status:    Intimate Partner Violence:      Fear of Current or Ex-Partner:      Emotionally Abused:      Physically Abused:      Sexually Abused:      Past Surgical History:   Procedure Laterality Date     C JORDAN W/O FACETEC FORAMOT/[facility] 1/2 VRT SEG, LUMBAR       COLONOSCOPY  1/5/2009    NORMAL/ REPEAT IN 10 years     HYSTEROSCOPY,ABLATION ENDOMETRIUM  1995     ZZC NONSPECIFIC PROCEDURE  1995    LEEP     ZZHC COLONOSCOPY THRU STOMA, DIAGNOSTIC  1/5/2009    normal/ Stone/ repeat in 10 years       ROS:  E/M: NEGATIVE for ear, nose, mouth and throat problems  R: NEGATIVE for significant/chronic cough or SOB  CV: NEGATIVE for chest pain or palpitations  GI: NEGATIVE for abdominal pain, chronic diarrhea or constipation  :  NEGATIVE for dysuria, hematuria or vaginal discharge. No sexual health concerns.       Current Outpatient Medications   Medication     cetirizine HCl (ZYRTEC ALLERGY) 10 MG CAPS     Cholecalciferol (VITAMIN D3) 3000 UNITS TABS     cyanocobalamin (VITAMIN  B-12) 1000 MCG tablet     Emollient (COLLAGEN EX)     ESTRADIOL 0.1MG/GM CREAM     FISH OIL 1000 MG OR CAPS     Flaxseed, Linseed, (FLAX SEED OIL) 1000 MG capsule     magnesium 100 MG CAPS     MULTIVITAMINS OR     Probiotic Product (PROBIOTIC ADVANCED PO)     traZODone (DESYREL) 50 MG tablet     valACYclovir (VALTREX) 1000 mg tablet     COMPOUNDED NON-CONTROLLED SUBSTANCE (CMPD RX) - PHARMACY TO MIX COMPOUNDED MEDICATION     No current facility-administered medications for this visit.       Patient Active Problem List    Diagnosis Date Noted     Menopausal syndrome (hot flashes) 03/22/2018     Priority: Medium     Insomnia 11/30/2015     Priority: Medium     Asthma, exercise induced 01/23/2013     Priority: Medium     Abnormal glucose 01/05/2011     Priority: Medium     Problem list name updated by automated process. Provider to review       Herpes simplex virus (HSV) infection 12/04/2008     Priority: Medium     Problem list name updated by  "automated process. Provider to review       Family history of diabetes mellitus      Priority: Medium     Health Care Home 10/25/2012     Priority: Low     State Tier Level:  Tier 1  Status:  None  Care Coordinator:      See Letters for [facility] Care Plan           ACP (advance care planning) 01/18/2012     Priority: Low     Advance Care Planning 2/10/2016: ACP Review of Chart / Resources Provided:  Reviewed chart for advance care plan.  Kori Howard has no plan or code status on file. Discussed available resources and provided with information. Confirmed code status reflects current choices pending further ACP discussions.  Confirmed/documented legally designated decision makers.  Added by Nicki Ellis                  Diagnosis updated by automated process. Provider to review and confirm.             OBJECTIVE:  /72 (BP Location: Left arm, Patient Position: Sitting, Cuff Size: Adult Large)   Pulse 65   Temp 97.4  F (36.3  C) (Temporal)   Ht 1.676 m (5' 6\")   Wt 85.7 kg (189 lb)   LMP 10/01/2019   SpO2 98%   BMI 30.51 kg/m          General: 56 year old female who appears her stated age. Vital signs noted  Head: Normocephalic  Eyes: pupils equal round reactive to light and accomodation, extra ocular movements intact  Ears: external canals and TMs free of abnormalities  Nose: patent, without mucosal abnormalities  Mouth and throat: without erythema or lesions of the mucosa  Neck: supple, without adenopathy or thyromegaly  Lungs: clear to auscultation, no wheezing or crackles  Breasts: skin without rash, no dominant mass, no nipple discharge, or axillary adenopathy  Cv: regular rate and rhythm, normal s1 and s2 without murmur or click  Abd: soft, non-tender, no masses, no hepatomegaly or splenomegaly.   (female): normal female external genitalia, normal urethral meatus, vaginal mucosa, normal cervix/adnexa/uterus without masses or discharge  Ms: normal muscle tone & symmetry  Skin: clear to " "inspection and with no palpable abnormalities.  Neuro: sensation and motor function grossly intact; cranial nerves without obvious abnormalities.    ASSESSMENT/PLAN:    1. Encounter for gynecological examination  Kori is doing relatively well today. Will update fasting labs, pap and send MyChart with results. Agreed to refer back to urology for further work-up and to determine if additional surgery is indicated. Refilled medications without change for 1 year.     2. Screening for metabolic disorder  - VENOUS COLLECTION    3. Screening for cervical cancer  - ThinPrep Pap and HPV (mRNa E6/E7) HPV always run([facility])  - VENOUS COLLECTION    4. Screening for diabetes mellitus    5. Screening for lipid disorders  - VENOUS COLLECTION  - Lipid Panel ([facility])    6. Chronic insomnia  - traZODone (DESYREL) 50 MG tablet; Take 1 tablet (50 mg) by mouth At Bedtime  Dispense: 90 tablet; Refill: 3    7. Herpes simplex virus infection  - valACYclovir (VALTREX) 1000 mg tablet; 2 tablets with onset of symptoms: repeat dose in 12 hours (update refills only)  Dispense: 12 tablet; Refill: 3    8. Atrophic vaginitis  - VENOUS COLLECTION  - Glucose Fasting ([facility])  - COMPOUNDED NON-CONTROLLED SUBSTANCE (CMPD RX) - PHARMACY TO MIX COMPOUNDED MEDICATION; Insert 1 gram vaginally 2-3 times weekly as needed  Dispense: 30 g; Refill: 3    9. History of bladder surgery  - Adult Urology Referral; Future    10. Symptom involving bladder  - Adult Urology Referral; Future     reports that she has never smoked. She has never used smokeless tobacco.      Estimated body mass index is 30.51 kg/m  as calculated from the following:    Height as of this encounter: 1.676 m (5' 6\").    Weight as of this encounter: 85.7 kg (189 lb).  Weight management plan: Discussed healthy diet and exercise guidelines      Labs pending:      Fasting glucose      Fasting lipids       Pap smear  Meds Suggested:      Vitamin D       Calcium  Tests Recommended:      Regular Dental " Examinations        Eye exam        Mammogram yearly  Behavior Modifications:       Cardiovascular exercise 3 times per week--enough to get your Target Heart rate  Other recommendations:    Health Care directive     BMI noted and discussed      Regular breast exam     Encouraged My Chart    Counseling Resources:  ATP IV Guidelines  Pooled Cohorts Equation Calculator  Breast Cancer Risk Calculator  FRAX Risk Assessment  ICSI Preventive Guidelines  Dietary Guidelines for Americans, 2010  [employer] MyPlate            Bridgett Decker PA-C  7/21/2021

## 2021-07-24 LAB
CLINICAL HISTORY - QUEST: NORMAL
COMMENT - QUEST: NORMAL
CYTOTECHNOLOGIST - QUEST: NORMAL
DESCRIPTIVE DIAGNOSIS - QUEST: NORMAL
HPV MRNA E6/E7: NOT DETECTED
LAST PAP DX - QUEST: NORMAL
LMP - QUEST: NORMAL
PREV BX DX - QUEST: NORMAL
SOURCE: NORMAL
STATEMENT OF ADEQUACY - QUEST: NORMAL

## 2021-10-24 ENCOUNTER — HEALTH MAINTENANCE LETTER (OUTPATIENT)
Age: 56
End: 2021-10-24

## 2021-10-26 ENCOUNTER — TELEPHONE (OUTPATIENT)
Dept: FAMILY MEDICINE | Facility: CLINIC | Age: 56
End: 2021-10-26

## 2021-10-26 NOTE — TELEPHONE ENCOUNTER
Signed. Pt has not signed so she will have to come sign, or have it mailed to her and submit herself.

## 2021-10-26 NOTE — TELEPHONE ENCOUNTER
Pt dropped off a biometric form to be completed. CPX on 7/21/2021.  In your pod to sign. I will fax, copy, and mail to pt.  Thanks.

## 2021-10-26 NOTE — TELEPHONE ENCOUNTER
Called pt and she is ok with us faxing without a signature, I will mail it and she can submit later if there are issues.

## 2022-05-12 ENCOUNTER — MYC MEDICAL ADVICE (OUTPATIENT)
Dept: FAMILY MEDICINE | Facility: CLINIC | Age: 57
End: 2022-05-12

## 2022-05-12 DIAGNOSIS — N95.2 ATROPHIC VAGINITIS: ICD-10-CM

## 2022-05-12 NOTE — TELEPHONE ENCOUNTER
Please review pt's MyChart message.    Kori Howard is requesting a refill of:    Pending Prescriptions:                       Disp   Refills    COMPOUNDED NON-CONTROLLED SUBSTANCE (CMPD*30 g                Sig: Insert 1 gram vaginally 2-3 times weekly as           needed

## 2022-06-12 ENCOUNTER — MYC MEDICAL ADVICE (OUTPATIENT)
Dept: FAMILY MEDICINE | Facility: CLINIC | Age: 57
End: 2022-06-12

## 2022-06-12 ENCOUNTER — HOSPITAL ENCOUNTER (EMERGENCY)
Facility: CLINIC | Age: 57
Discharge: HOME | End: 2022-06-12
Attending: EMERGENCY MEDICINE | Admitting: EMERGENCY MEDICINE
Payer: COMMERCIAL

## 2022-06-12 VITALS
OXYGEN SATURATION: 99 % | TEMPERATURE: 97 F | DIASTOLIC BLOOD PRESSURE: 84 MMHG | RESPIRATION RATE: 12 BRPM | HEART RATE: 80 BPM | SYSTOLIC BLOOD PRESSURE: 140 MMHG

## 2022-06-12 DIAGNOSIS — G47.00 INSOMNIA, UNSPECIFIED TYPE: ICD-10-CM

## 2022-06-12 DIAGNOSIS — F43.0 ACUTE REACTION TO STRESS: ICD-10-CM

## 2022-06-12 DIAGNOSIS — F43.22 ADJUSTMENT DISORDER WITH ANXIOUS MOOD: ICD-10-CM

## 2022-06-12 LAB
ANION GAP SERPL CALCULATED.3IONS-SCNC: 5 MMOL/L (ref 3–14)
BUN SERPL-MCNC: 13 MG/DL (ref 7–30)
CALCIUM SERPL-MCNC: 9.1 MG/DL (ref 8.5–10.1)
CHLORIDE BLD-SCNC: 102 MMOL/L (ref 94–109)
CO2 SERPL-SCNC: 25 MMOL/L (ref 20–32)
CREAT SERPL-MCNC: 0.5 MG/DL (ref 0.52–1.04)
ETHANOL SERPL-MCNC: <0.01 G/DL
GFR SERPL CREATININE-BSD FRML MDRD: >90 ML/MIN/1.73M2
GLUCOSE BLD-MCNC: 105 MG/DL (ref 70–99)
POTASSIUM BLD-SCNC: 3.7 MMOL/L (ref 3.4–5.3)
SODIUM SERPL-SCNC: 132 MMOL/L (ref 133–144)
TROPONIN I SERPL HS-MCNC: 5 NG/L
TSH SERPL DL<=0.005 MIU/L-ACNC: 0.81 MU/L (ref 0.4–4)

## 2022-06-12 PROCEDURE — 93005 ELECTROCARDIOGRAM TRACING: CPT

## 2022-06-12 PROCEDURE — 258N000003 HC RX IP 258 OP 636: Performed by: EMERGENCY MEDICINE

## 2022-06-12 PROCEDURE — 250N000011 HC RX IP 250 OP 636: Performed by: EMERGENCY MEDICINE

## 2022-06-12 PROCEDURE — 84443 ASSAY THYROID STIM HORMONE: CPT | Performed by: EMERGENCY MEDICINE

## 2022-06-12 PROCEDURE — 96361 HYDRATE IV INFUSION ADD-ON: CPT

## 2022-06-12 PROCEDURE — 90791 PSYCH DIAGNOSTIC EVALUATION: CPT

## 2022-06-12 PROCEDURE — 96374 THER/PROPH/DIAG INJ IV PUSH: CPT

## 2022-06-12 PROCEDURE — 84484 ASSAY OF TROPONIN QUANT: CPT | Performed by: EMERGENCY MEDICINE

## 2022-06-12 PROCEDURE — 250N000013 HC RX MED GY IP 250 OP 250 PS 637: Performed by: STUDENT IN AN ORGANIZED HEALTH CARE EDUCATION/TRAINING PROGRAM

## 2022-06-12 PROCEDURE — 250N000013 HC RX MED GY IP 250 OP 250 PS 637: Performed by: EMERGENCY MEDICINE

## 2022-06-12 PROCEDURE — 82310 ASSAY OF CALCIUM: CPT | Performed by: EMERGENCY MEDICINE

## 2022-06-12 PROCEDURE — 36415 COLL VENOUS BLD VENIPUNCTURE: CPT | Performed by: EMERGENCY MEDICINE

## 2022-06-12 PROCEDURE — 82077 ASSAY SPEC XCP UR&BREATH IA: CPT | Performed by: EMERGENCY MEDICINE

## 2022-06-12 PROCEDURE — 96375 TX/PRO/DX INJ NEW DRUG ADDON: CPT

## 2022-06-12 PROCEDURE — 99285 EMERGENCY DEPT VISIT HI MDM: CPT | Mod: 25

## 2022-06-12 RX ORDER — OLANZAPINE 5 MG/1
5 TABLET, ORALLY DISINTEGRATING ORAL ONCE
Status: COMPLETED | OUTPATIENT
Start: 2022-06-12 | End: 2022-06-12

## 2022-06-12 RX ORDER — LORAZEPAM 1 MG/1
1 TABLET ORAL ONCE
Status: COMPLETED | OUTPATIENT
Start: 2022-06-12 | End: 2022-06-12

## 2022-06-12 RX ORDER — LORAZEPAM 1 MG/1
1 TABLET ORAL EVERY 8 HOURS PRN
Qty: 10 TABLET | Refills: 0 | Status: SHIPPED | OUTPATIENT
Start: 2022-06-12 | End: 2022-12-13

## 2022-06-12 RX ORDER — ONDANSETRON 2 MG/ML
4 INJECTION INTRAMUSCULAR; INTRAVENOUS ONCE
Status: COMPLETED | OUTPATIENT
Start: 2022-06-12 | End: 2022-06-12

## 2022-06-12 RX ORDER — LORAZEPAM 2 MG/ML
0.5 INJECTION INTRAMUSCULAR ONCE
Status: COMPLETED | OUTPATIENT
Start: 2022-06-12 | End: 2022-06-12

## 2022-06-12 RX ADMIN — SODIUM CHLORIDE 1000 ML: 9 INJECTION, SOLUTION INTRAVENOUS at 16:03

## 2022-06-12 RX ADMIN — OLANZAPINE 5 MG: 5 TABLET, ORALLY DISINTEGRATING ORAL at 14:47

## 2022-06-12 RX ADMIN — ONDANSETRON 4 MG: 2 INJECTION INTRAMUSCULAR; INTRAVENOUS at 16:03

## 2022-06-12 RX ADMIN — LORAZEPAM 1 MG: 1 TABLET ORAL at 18:35

## 2022-06-12 RX ADMIN — LORAZEPAM 0.5 MG: 2 INJECTION INTRAMUSCULAR at 16:03

## 2022-06-12 ASSESSMENT — ENCOUNTER SYMPTOMS
NERVOUS/ANXIOUS: 1
PALPITATIONS: 1
NUMBNESS: 0
DIARRHEA: 1
SLEEP DISTURBANCE: 1
VOMITING: 0
NAUSEA: 1

## 2022-06-12 NOTE — ED TRIAGE NOTES
A&O x4.  ABC's intact.      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

## 2022-06-12 NOTE — CONSULTS
"6/12/2022  Kori Howard 1965     [facility] Crisis Assessment    Patient was assessed: remote  Patient location: [place]  Was a release of information signed: Yes. Providers included on the release: [facility], [facility] [place], [facility]    Referral Data and Chief Complaint  Kori Howard is a 57 year old who uses she/her pronouns. Patient presented to the ED alone and was referred to the ED by self. Patient is presenting to the ED for the following concerns: Per HPI:Kori Howard is a healthy 57 year old female who presents with insomnia and anxiety.  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  This is causing her significant stress and anxiety that has been progressively escalating.  She has been unable to sleep because she \"cannot shut my brain off\".  She feels like \"I am having a heart attack\" which she describes as tingling in her hands and racing heart.  She called her 's insurer yesterday and spoke with them about her symptoms which did help, but she feels she is unable to control her symptoms today.      Informed Consent and Assessment Methods    Patient is her own guardian. Writer met with patient and explained the crisis assessment process, including applicable information disclosures and limits to confidentiality, assessed understanding of the process, and obtained consent to proceed with the assessment. Patient was observed to be able to participate in the assessment as evidenced by willing to engage with assessment. Assessment methods included conducting a formal interview with patient, review of medical records, collaboration with medical staff, and obtaining relevant collateral information from family and community providers when available.    Narrative Summary   What led to the patient presenting for crisis services, factors that make the crisis life " threatening or complex, stressors, how is this disrupting the patient's life, and how current functioning is in comparison to baseline. How is patient presenting during the assessment. Duration of the current crisis, coping skills attempted to reduce the crisis, community resources used, and past presentations.    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 Pt has a great deal of anxiety about this and has not been able to sleep or function well since finding out 5 days ago. Pt states she has racing thoughts, worrying, pacing, panic and chest pain. Pt believes that her anxiety and mood issues began at the start of the pandemic but this new change has made it unbearable. Pt denies concerns regarding SI or self harm but feels her anxiety is out of control. Pt has worked with a therapist briefly in the past but has never been prescribed medication for her mental health. Pt has never been hospitalized and denies concerns regarding substances.    During assessment pt was tearful but able to remain engaged with writer. Conversation was linear with appropriate eye contact. Pt did not appear to be responding to internal stimuli. Pt was able to discuss options that she has used to calm herself down but it has not worked over the past week.      Collateral Information  Medical records reviewed.     Risk Assessment    Risk of Harm to Self     ESS-6  1.a. Over the past 2 weeks, have you had thoughts of killing yourself? No  1.b. Have you ever attempted to kill yourself and, if yes, when did this last happen? No   2. Recent or current suicide plan? No   3. Recent or current intent to act on ideation? No  4. Lifetime psychiatric hospitalization? No  5. Pattern of excessive substance use? No  6. Current irritability, agitation, or aggression? No  Scoring note: BOTH 1a and 1b must be yes for it to score 1 point, if both are not yes it is zero. All others are  1 point per number. If all questions 1a/1b - 6 are no, risk is negligible. If one of 1a/1b is yes, then risk is mild. If either question 2 or 3, but not both, is yes, then risk is automatically moderate regardless of total score. If both 2 and 3 are yes, risk is automatically high regardless of total score.     Score: 0, negligible risk    The patient has the following risk factors for suicide: depressive symptoms and recent loss    Is the patient experiencing current suicidal ideation: No    Is the patient engaging in preparatory suicide behaviors (formulating how to act on plan, giving away possessions, saying goodbye, displaying dramatic behavior changes, etc)? No    Does the patient have access to firearms or other lethal means? no    The patient has the following protective factors: social support, voluntarily seeking mental health support, chun system, displays insight, expresses desire to engage in treatment and safe/stable housing    Support system information: Reports that her family is supportive.    Patient strengths: Is seeking support and willing to get mental health support.     Does the patient engage in non-suicidal self-injurious behavior (NSSI/SIB)? no    Is the patient vulnerable to sexual exploitation?  No    Is the patient experiencing abuse or neglect? no    Is the patient a vulnerable adult? No      Risk of Harm to Others  The patient has the following risk factors of harm to others: no risk factors identified    Does the patient have thoughts of harming others? No    Is the patient engaging in sexually inappropriate behavior?  no       Current Substance Abuse    Is there recent substance abuse? no    Was a urine drug screen or blood alcohol level obtained: No    Current Symptoms/Concerns    Symptoms  Attention, hyperactivity, and impulsivity symptoms present: No    Anxiety symptoms present: Yes: Panic attacks and Generalized Symptoms: Cognitive anxiety - feelings of doom, racing thoughts,  difficulty concentrating , Excessive worry and Physiological anxiety - sweating, flushing, shaking, shortness of breath, or racing heart      Appetite symptoms present: Yes: Loss of Appetite     Behavioral difficulties present: No     Cognitive impairment symptoms present: No    Depressive symptoms present: Yes Appetite change/weight change , Crying or feels like crying, Excessive guilt , Impaired concentration, Increased irritability/agitation, Isolative , Loss of interest / Anhedonia  and Sleep disturbance      Eating disorder symptoms present: No    Learning disabilities, cognitive challenges, and/or developmental disorder symptoms present: No     Manic/hypomanic symptoms present: No    Personality and interpersonal functioning difficulties present : No    Psychosis symptoms present: No      Sleep difficulties present: Yes: Difficulty falling asleep     Substance abuse disorder symptoms present: No     Trauma and stressor related symptoms present: No       Mental Status Exam   Affect: Appropriate   Appearance: Appropriate    Attention Span/Concentration: Attentive?    Eye Contact: Engaged   Fund of Knowledge: Appropriate    Language /Speech Content: Fluent   Language /Speech Volume: Normal    Language /Speech Rate/Productions: Normal    Recent Memory: Intact   Remote Memory: Intact   Mood: Anxious    Orientation to Person: Yes    Orientation to Place: Yes   Orientation to Time of Day: Yes    Orientation to Date: Yes    Situation (Do they understand why they are here?): Yes    Psychomotor Behavior: Normal    Thought Content: Clear   Thought Form: Goal Directed       Mental Health and Substance Abuse History    History  Current and historical diagnoses or mental health concerns: Adjustment disorder    Prior MH services (inpatient, programmatic care, outpatient, etc) : Yes Therapy    Has the patient used [facility] crisis team services before?: No    History of substance abuse: No    Prior DESI services (inpatient,  programmatic care, detox, outpatient, etc) : No    History of commitment: No    Family history of MH/DEIS: Yes CD issues    Trauma history: No    Medication  Psychotropic medications: No    Current Care Team  Primary Care Provider: [facility]    Biopsychosocial Information    Socioeconomic Information  Current living situation: Lives with      Employment/income source: [employer] for 34 years    Relevant legal issues: None     Cultural, Roman Catholic, or spiritual influences on mental health care: Pt is Roman Catholic    Is the patient active in the  or a : No      Relevant Medical Concerns   Patient identifies concerns with completing ADLs? No     Patient can ambulate independently? Yes     Other medical concerns? No     History of concussion or TBI? No        Diagnosis    F43.22 Adjustment Disorder with anxiety       Therapeutic Intervention  The following therapeutic methodologies were employed when working with the patient: establishing rapport, active listening, assessing dimensions of crisis, identifying additional supports and alternative coping skills, establishing a discharge plan and treatment planning. Patient response to intervention: Receptive.      Disposition  Recommended disposition: Individual Therapy and Medication Management      Reviewed case and recommendations with attending provider. Attending Name: Dr. Marina      Attending concurs with disposition: Yes      Patient concurs with disposition: Yes      Guardian concurs with disposition: Yes     Final disposition: Individual therapy  and Medication management.      Clinical Substantiation of Recommendations   Rationale with supporting factors for disposition and diagnosis.     As pt is able to make a appropriate plans for safety and has no concerns regarding SI so she will discharge with follow up in place. Pt was referred to the transition clinic for medication management as an appropriate appointment was not  available for a sooner time in [facility]. Pt has little mental health history but with this recent change in her life she is having difficulty coping and would benefit from therapy to work through her anxiety. Pt expresses having concerns with her mood for several years and requested to see a provider regarding medication which is appropriate.     Assessment Details  Patient interview started at: 6:20pm and completed at: 6:50pm.    Total duration spent on the patient case in minutes: .75 hrs     CPT code(s) utilized: 92128 - Psychotherapy for Crisis - 60 (30-74*) min       Aftercare and Safety Planning  Follow up plans with MH/DESI services: Yes Medication management and therapy      Aftercare plan placed in the AVS and provided to patient: Yes. Given to patient by Nurse    Vale De Luna, [facility]      Aftercare Plan  [facility] SCHEDULING:  Today you were seen by a licensed mental health professional through [facility] and [facility] ([facility])  for a crisis assessment in the Emergency Department at [facility].  It is recommended that you follow up with your estabished providers (psychiatrist, mental health therapist, and/or primary care doctor - as relevant) as soon as possible. Coordinators from [facility] will be calling you in the next 24-48 hours to ensure that you have the resources you need.  You can also contact [facility] coordinators directly at [phone].    You have been scheduled the following appointments:    Date: Thursday, 6/16/2022  Time: 11:00 am - 12:00 pm  Provider: Adán Garcia  Location: [address], [address], [place] [address]  Phone: [phone]  Type: Teletherapy    Date: Tuesday, 6/28/2022  Time: 1:00 pm - 2:00 pm  Provider: Cecilia Sarmiento MA, CNP,RN  Location: [facility], [address] [place] [address], [address], [place] [address]  Phone: [phone]  Type: Medication Mgmt - Initial     *If I Am Having Difficulty  Or Am In A Crisis, I Will:    ? Contact my established care providers    ? Call Suicide Hotline (0-560-876-[facility])    ? Go to the nearest Emergency Department    ? Call 9-1-1       Warnings Signs That A Crisis May Be Developing:     Engaging in risky activities without thinking  Withdrawing from family/friends  Dramatic mood swings  Drastic personality changes   Use of alcohol or drugs  Neglect of personal hygiene or cares      Things That Make Me Feel Better:            Family, music, friends, exercise, relaxation breathing    People and Social Settings That Provide Distraction:      My friends, family,   Exercising  Listening to music  Journaling  Reading a book  Watching a movie  Meditating     People Whom I Can Ask For Help:       Family   Friends  Mental health providers     Your [facility] has a mental health crisis team you can call 24/7: [facility]  [phone]    Appointment information and/or additional resources available to me:     Reduce Extreme Emotion  QUICKLY:  Changing Your Body Chemistry      T:  Change your body Temperature to change your autonomic nervous system   ? Use Ice Water to calm yourself down FAST   ? Put your face in a bowl of ice water (this is the best way; have the person keep his/her face in ice water for 30-45 seconds - initial research is showing that the longer s/he can hold her/his face in the water, the better the response), or   ? Splash ice water on your face, or hold an ice pack on your face      I:  Intensely exercise to calm down a body revved up by emotion   ? Examples: running, walking fast, jumping, playing basketball, weight lifting, swimming, calisthenics, etc.   ? Engage in exercises that DO NOT include violent behaviors. Exercises that utilize violent behaviors tend to function as  behavioral rehearsal,  and rather than calming the person down, may actually  rev  the person up more, increasing the likelihood of violence, and lessening the likelihood that they  will  burn off  energy     P:  Progressively relax your muscles   ? Starting with your hands, moving to your forearms, upper arms, shoulders, neck, forehead, eyes, cheeks and lips, tongue and teeth, chest, upper back, stomach, buttocks, thighs, calves, ankles, feet   ? Tense (10 seconds,   of the way), then relax each muscle (all the way)   ? Notice the tension   ? Notice the difference when relaxed (by tensing first, and then relaxing, you are able to get a more thorough relaxation than by simply relaxing)     P: Paced breathing to relax   ? The standard technique is to begin with counting the number of steps one takes for a typical inhale, then counting the steps one takes for a typical exhale, and then lengthening the amount of steps for the exhalation by one or two steps.  OR  ? Repeat this pattern for 1-2 minutes  ? Inhale for four (4) seconds   ? Exhale for six (6) to eight (8) seconds   ? Research demonstrated that one can change one's overall level of anxiety by doing this exercise for even a few minutes per day       Stop and Breathe     When anxiety flares, take a time out and think about what it is that is making you so nervous. Anxiety is typically experienced as worrying about a future or past event.   For example, you may be worried that something bad is going to happen in the future. Perhaps you continually feel upset over an event that has already occurred. Regardless of what you are worried about, a big part of the problem is that you are not being mindful of the present moment. Anxiety loses its  when you clear your mind of worry and bring your awareness back to the present.   The next time your anxiety starts to take you out of the present, regain control by sitting down and taking a few easy breaths. Simply stopping and breathing can help restore a sense of personal balance and bring you back to the present moment. However, if you have the time, try taking this activity a little further and  experiment with a breathing exercise and mantra     Focus On What You Can Change     Many times anxiety stems from fearing things that haven t even happened and may never occur. For example, even though everything is okay, you may still worry about potential issues, such as losing your job, becoming ill, or the safety of your loved ones. Life can be unpredictable and no matter how hard you try, you can t always control what happens. However, you can decide how you are going to deal with the unknown. You can turn your anxiety into a source of strength by letting go of fear and focusing on gratitude.     Replace your fears by changing your attitude about them. For example, stop fearing to lose your job and instead focus on how grateful you are to have a job. Come to work determined to do your best. Instead of fearing your loved one's safety, spend time with them, or express your appreciation of them. With a little practice, you can learn to dump your anxiety and  a more positive outlook.  At times, your anxiety may actually be caused by a real circumstance in your life. Perhaps you re in a situation where it is realistic to be worried about losing your job due to high company layoffs or talks of downsizing. When anxiety is identified as being caused by a current problem, then taking action may be the answer to reducing your anxiety.4? For example, you may need to start job searching or scheduling interviews after work. By being more proactive, you can feel like you have a bit more control over your situation.     Focus on Something Less Anxiety-Provoking     At times, it may be most helpful to simply redirect yourself to focus on something other than your anxiety. You may want to reach out to others, do some work around your home, or engage in an enjoyable activity or hobby. Here are a few ideas of things you can do to thwart off anxiety:     Do some chores or organizing around the house.     Engage in a  "creative activity, such as drawing, painting, or writing.     Go for a ?walk or engage in some other form of physical exercise.     Listen to music.     [place] or meditate.     Read a good book or watch a funny movie.     Most people are familiar with experiencing some anxiety from time-to-time. However, chronic anxiety can be a sign of a diagnosable anxiety disorder.     When anxiety affects one s relationships, work performance, and other areas of life, there is potential that these anxious feelings are actually an indication of mental health illness.     If you are experiencing anxiety and panic symptoms, talk with your doctor or other professionals who treat panic disorder. They will be able to address any concerns you have, provide information on diagnosis, and discuss your treatment options.    Crisis Lines  Crisis Text Line  Text 557081  You will be connected with a trained live crisis counselor to provide support.    Por espanol, texto  KATALINA a 656117 o texto a 442-AYUDAME en WhatsApp    National Hope Line  1.800.SUICIDE [0062706]    National Suicide Prevention Lifeline at 6-394-986-ORRI (9736)     Throughout  [place]: call **CRISIS (**684629)       The [employer] at 350-169-7970       Community Resources  Fast Tracker  Linking people to mental health and substance use disorder resources  fasttrackQuantum Imagingn.org     [place] Mental Health Warm Line  Peer to peer support  Monday thru Saturday, 12 pm to 10 pm  274.036.1542 or 9.417.866.7630  Text \"Support\" to 54507    National [place] on Mental Illness (FRAN)  222.734.1099 or 1.888.FRAN.HELPS        Mental Health Apps  My3  https://myDisrupt6pp.org/    VirtualHopeBox  https://Ticketfly.org/apps/virtual-hope-box/      Additional Information  Today you were seen by a licensed mental health professional through [facility], [facility])  for a crisis assessment in the Emergency Department at [facility].  " It is recommended that you follow up with your established providers (psychiatrist, mental health therapist, and/or primary care doctor - as relevant) as soon as possible. Coordinators from [facility] will be calling you in the next 24-48 hours to ensure that you have the resources you need.  You can also contact [facility] coordinators directly at [phone]. You may have been scheduled for or offered an appointment with a mental health provider. [facility] maintains an extensive network of licensed behavioral health providers to connect patients with the services they need.  We do not charge providers a fee to participate in our referral network.  We match patients with providers based on a patient's specific needs, insurance coverage, and location.  Our first effort will be to refer you to a provider within your care system, and will utilize providers outside your care system as needed.

## 2022-06-12 NOTE — ED PROVIDER NOTES
"  History     Chief Complaint:  Insomnia and Anxiety      The history is provided by the patient.      Kori Howard is a healthy 57 year old female who presents with insomnia and anxiety.  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  This is causing her significant stress and anxiety that has been progressively escalating.  She has been unable to sleep because she \"cannot shut my brain off\".  She feels like \"I am having a heart attack\" which she describes as tingling in her hands and racing heart.  She called her 's insurer yesterday and spoke with them about her symptoms which did help, but she feels she is unable to control her symptoms today.  She does not currently take any medications for anxiety nor does she have outpatient resources.  She denies any thoughts of suicide.  She has tried trazodone, Benadryl, and alcohol to help her sleep including 3 alcoholic beverages last night.  She has had diarrhea and feels somewhat nauseous today.    Review of Systems   Cardiovascular: Positive for palpitations.   Gastrointestinal: Positive for diarrhea and nausea. Negative for vomiting.   Neurological: Negative for numbness (tingling).   Psychiatric/Behavioral: Positive for sleep disturbance. Negative for suicidal ideas. The patient is nervous/anxious.    All other systems reviewed and are negative.    Allergies:  Codeine  Oxaprozin  Oxycodone    Medications:    Trazodone     Past Medical History:    Patient Active Problem List    Diagnosis Date Noted     Menopausal syndrome (hot flashes) 03/22/2018     Priority: Medium     Insomnia 11/30/2015     Priority: Medium     Asthma, exercise induced 01/23/2013     Priority: Medium     Abnormal glucose 01/05/2011     Herpes simplex virus (HSV) infection 12/04/2008      Past Surgical History:    Past Surgical History:   Procedure Laterality Date     BLADDER SURGERY      2009     COLONOSCOPY  " 01/05/2009    NORMAL/ REPEAT IN 10 years     HYSTEROSCOPY,ABLATION ENDOMETRIUM  1995     [facility] JORDAN W/O FACETEC FORAMOT/[facility] 1/2 VRT SEG, LUMBAR       ZZC NONSPECIFIC PROCEDURE  1995    LEEP     [facility] COLONOSCOPY THRU STOMA, DIAGNOSTIC  01/05/2009    normal/ Stone/ repeat in 10 years       Family History:    Family History   Problem Relation Age of Onset     Heart Disease Mother      Diabetes Father      Heart Disease Father      Hypertension Father        Social History:  The patient presents to the ED with her .  Occasional alcohol use.   Works at a salon.    Physical Exam     Patient Vitals for the past 24 hrs:   BP Temp Temp src Pulse Resp SpO2   06/12/22 1500 (!) 168/90 -- -- 75 -- 100 %   06/12/22 1421 (!) 183/131 97  F (36.1  C) Temporal 90 18 100 %       Physical Exam  General: Well-developed and well-nourished. Well appearing middle aged  woman. Cooperative.  Head:  Atraumatic.  Eyes:  Conjunctivae, lids, and sclerae are normal.  Neck:  Supple. Normal range of motion.  CV:  Regular rate and rhythm. Normal heart sounds with no murmurs, rubs, or gallops detected.  Resp:  No respiratory distress. Clear to auscultation bilaterally without decreased breath sounds, wheezing, rales, or rhonchi.  GI:  Soft. Non-distended. Non-tender.    MS:  Normal ROM.   Skin:  Warm. Non-diaphoretic. No pallor.  Neuro: Awake. A&Ox3. Normal strength.  Psych:  Anxious, tearful mood and affect. Normal speech. No suicidal thought content.  Vitals reviewed.    Emergency Department Course   EKG  Indication: palpitations  Time: 1607  Rate 73 bpm. VA interval 166. QRS duration 86. QT/QTc 392/431.   Normal sinus rhythm  Normal ECG  No acute ST changes.  No change as compared to prior, dated 3/8/21.    Laboratory:  Labs Ordered and Resulted from Time of ED Arrival to Time of ED Departure   BASIC METABOLIC PANEL - Abnormal       Result Value    Sodium 132 (*)     Potassium 3.7      Chloride 102      Carbon Dioxide (CO2) 25       "Anion Gap 5      Urea Nitrogen 13      Creatinine 0.50 (*)     Calcium 9.1      Glucose 105 (*)     GFR Estimate >90     TSH WITH FREE T4 REFLEX - Normal    TSH 0.81     TROPONIN I - Normal    Troponin I High Sensitivity 5     ETHYL ALCOHOL LEVEL - Normal    Alcohol ethyl <0.01       Emergency Department Course:  Reviewed:  I reviewed nursing notes, vitals, and past medical history.     Assessments:   I obtained history and examined the patient as noted above.   1816 I rechecked the patient and explained findings.  Despite earlier telling the nurse that she \"felt like a new person,\" she tells me she is feeling anxious and was unable to sleep.    Interventions:  Medications   OLANZapine zydis (zyPREXA) ODT tab 5 mg (5 mg Oral Given 6/12/22 1447)   0.9% sodium chloride BOLUS (1000 mLs Intravenous Stopped 6/12/22 1835)   LORazepam (ATIVAN) injection 0.5 mg (0.5 mg Intravenous Given 6/12/22 1603)   ondansetron (ZOFRAN) injection 4 mg (4 mg Intravenous Given 6/12/22 1603)   LORazepam (ATIVAN) tablet 1 mg (1 mg Oral Given 6/12/22 1835)     Disposition:  Care of the patient was transferred to my colleague Dr. Marina pending DEC assessment.     Impression & Plan    Medical Decision Making:  Kori is a 57 year old woman who is having a significant life change through her work which she found out about in the last 2 weeks.  This is causing her significant stress and anxiety which is resulted in insomnia.  She is having a difficult time coping at home.  She does not have any suicidal ideation.  On exam she appears well, though is very tearful and anxious.  She tells me it feels like she is having a heart attack.  Although these symptoms are almost certainly related to anxiety alone, I did perform an EKG which is reassuring without acute ST changes or arrhythmias.  Troponin is normal.  Basic metabolic panel shows mild hyponatremia to 132 which is likely to resolve with the IV fluids administered in the emergency department.  " TSH is normal.    Kori initially felt improved with Zyprexa ordered by a triage provider, Zofran, and IV Ativan.  However, she did have recurrence of her anxiety while awaiting DEC assessment and I gave her an additional dose of oral Ativan.  This woman has no outpatient resources and would greatly benefit from therapy and medication management appointments.  At the end of my shift she continues to await DEC assessment and was endorsed to my partner, Dr. Marina, for disposition after her assessment which will likely be discharge.  I have updated her on findings and plan and answered all her questions.  She verbalized understanding and is amenable.  I will plan on discharging her with a small number of Ativan as a stopgap until she can be seen by outpatient providers.      Covid-19  Kori Howard was evaluated during a global COVID-19 pandemic, which necessitated consideration that the patient might be at risk for infection with the SARS-CoV-2 virus that causes COVID-19.   Applicable protocols for evaluation were followed during the patient's care.     Diagnosis:    ICD-10-CM    1. Acute reaction to stress  F43.0    2. Adjustment disorder with anxious mood  F43.22    3. Insomnia, unspecified type  G47.00           Lisbet Allison MD  06/16/22 2175

## 2022-06-12 NOTE — ED NOTES
Pt reports feeling less anxious and more comfortable. She states her legs are no longer shaking and she is able to relax. Pt eating box lunch and waiting for DEC assessment.

## 2022-06-12 NOTE — DISCHARGE INSTRUCTIONS
Ativan as needed for anxiety or sleep.  You can also continue Benadryl or melatonin.  Resources as provided.  Your primary care provider can also help you with medication management if you want to try long-term treatment such as Prozac.  Return with worsening symptoms or new concerns.    Aftercare Plan  [facility] SCHEDULING:  Today you were seen by a licensed mental health professional through [facility] ([facility])  for a crisis assessment in the Emergency Department at [facility].  It is recommended that you follow up with your estabished providers (psychiatrist, mental health therapist, and/or primary care doctor - as relevant) as soon as possible. Coordinators from [facility] will be calling you in the next 24-48 hours to ensure that you have the resources you need.  You can also contact [facility] coordinators directly at [phone].    You have been scheduled the following appointments:    Date: Thursday, 6/16/2022  Time: 11:00 am - 12:00 pm  Provider: Adán Garcia  Location: [address], [place] [address]  Phone: [phone]  Type: Teletherapy    Date: Tuesday, 6/28/2022  Time: 1:00 pm - 2:00 pm  Provider: Cecilia Sarmiento MA, CNP,RN  Location: [facility], [address] [place] [address], [address], [place] [address]  Phone: [phone]  Type: Medication Mgmt - Initial     *If I Am Having Difficulty Or Am In A Crisis, I Will:    ? Contact my established care providers    ? Call Suicide Hotline (0-909-440-DKJD)    ? Go to the nearest Emergency Department    ? Call 9-1-1       Warnings Signs That A Crisis May Be Developing:     Engaging in risky activities without thinking  Withdrawing from family/friends  Dramatic mood swings  Drastic personality changes   Use of alcohol or drugs  Neglect of personal hygiene or cares      Things That Make Me Feel Better:            Family, music, friends, exercise, relaxation  breathing    People and Social Settings That Provide Distraction:      My friends, family,   Exercising  Listening to music  Journaling  Reading a book  Watching a movie  Meditating     People Whom I Can Ask For Help:       Family   Friends  Mental health providers     Your [facility] has a mental health crisis team you can call 24/7: [facility] Crisis  [phone]    Appointment information and/or additional resources available to me:     Reduce Extreme Emotion  QUICKLY:  Changing Your Body Chemistry      T:  Change your body Temperature to change your autonomic nervous system   Use Ice Water to calm yourself down FAST   Put your face in a bowl of ice water (this is the best way; have the person keep his/her face in ice water for 30-45 seconds - initial research is showing that the longer s/he can hold her/his face in the water, the better the response), or   Splash ice water on your face, or hold an ice pack on your face      I:  Intensely exercise to calm down a body revved up by emotion   Examples: running, walking fast, jumping, playing basketball, weight lifting, swimming, calisthenics, etc.   Engage in exercises that DO NOT include violent behaviors. Exercises that utilize violent behaviors tend to function as  behavioral rehearsal,  and rather than calming the person down, may actually  rev  the person up more, increasing the likelihood of violence, and lessening the likelihood that they will  burn off  energy     P:  Progressively relax your muscles   Starting with your hands, moving to your forearms, upper arms, shoulders, neck, forehead, eyes, cheeks and lips, tongue and teeth, chest, upper back, stomach, buttocks, thighs, calves, ankles, feet   Tense (10 seconds,   of the way), then relax each muscle (all the way)   Notice the tension   Notice the difference when relaxed (by tensing first, and then relaxing, you are able to get a more thorough relaxation than by simply relaxing)     P: Paced breathing  to relax   The standard technique is to begin with counting the number of steps one takes for a typical inhale, then counting the steps one takes for a typical exhale, and then lengthening the amount of steps for the exhalation by one or two steps.  OR  Repeat this pattern for 1-2 minutes  Inhale for four (4) seconds   Exhale for six (6) to eight (8) seconds   Research demonstrated that one can change one's overall level of anxiety by doing this exercise for even a few minutes per day       Stop and Breathe     When anxiety flares, take a time out and think about what it is that is making you so nervous. Anxiety is typically experienced as worrying about a future or past event.   For example, you may be worried that something bad is going to happen in the future. Perhaps you continually feel upset over an event that has already occurred. Regardless of what you are worried about, a big part of the problem is that you are not being mindful of the present moment. Anxiety loses its  when you clear your mind of worry and bring your awareness back to the present.   The next time your anxiety starts to take you out of the present, regain control by sitting down and taking a few easy breaths. Simply stopping and breathing can help restore a sense of personal balance and bring you back to the present moment. However, if you have the time, try taking this activity a little further and experiment with a breathing exercise and mantra     Focus On What You Can Change     Many times anxiety stems from fearing things that haven t even happened and may never occur. For example, even though everything is okay, you may still worry about potential issues, such as losing your job, becoming ill, or the safety of your loved ones. Life can be unpredictable and no matter how hard you try, you can t always control what happens. However, you can decide how you are going to deal with the unknown. You can turn your anxiety into a source of  strength by letting go of fear and focusing on gratitude.     Replace your fears by changing your attitude about them. For example, stop fearing to lose your job and instead focus on how grateful you are to have a job. Come to work determined to do your best. Instead of fearing your loved one's safety, spend time with them, or express your appreciation of them. With a little practice, you can learn to dump your anxiety and  a more positive outlook.  At times, your anxiety may actually be caused by a real circumstance in your life. Perhaps you re in a situation where it is realistic to be worried about losing your job due to high company layoffs or talks of downsizing. When anxiety is identified as being caused by a current problem, then taking action may be the answer to reducing your anxiety.4? For example, you may need to start job searching or scheduling interviews after work. By being more proactive, you can feel like you have a bit more control over your situation.     Focus on Something Less Anxiety-Provoking     At times, it may be most helpful to simply redirect yourself to focus on something other than your anxiety. You may want to reach out to others, do some work around your home, or engage in an enjoyable activity or hobby. Here are a few ideas of things you can do to thwart off anxiety:     Do some chores or organizing around the house.     Engage in a creative activity, such as drawing, painting, or writing.     Go for a walk or engage in some other form of physical exercise.     Listen to music.     Baird or meditate.     Read a good book or watch a funny movie.     Most people are familiar with experiencing some anxiety from time-to-time. However, chronic anxiety can be a sign of a diagnosable anxiety disorder.     When anxiety affects one s relationships, work performance, and other areas of life, there is potential that these anxious feelings are actually an indication of mental health  "illness.     If you are experiencing anxiety and panic symptoms, talk with your doctor or other professionals who treat panic disorder. They will be able to address any concerns you have, provide information on diagnosis, and discuss your treatment options.    Crisis Lines  Crisis Text Line  Text 898894  You will be connected with a trained live crisis counselor to provide support.    Por espanol, texto  KATALINA a 341822 o texto a 442-AYUDAME en WhatsApp    National Hope Line  1.800.SUICIDE [2942236]    National Suicide Prevention Lifeline at 2-920-194-TALK (7978)     Throughout  [place]: call **CRISIS (**223565)       The [employer] at 052-224-4529       Community Resources  Fast Tracker  Linking people to mental health and substance use disorder resources  [employer]Smarty Ring     [place] Mental Health Warm Line  Peer to peer support  Monday thru Saturday, 12 pm to 10 pm  974.190.6666 or 1.696.872.7539  Text \"Support\" to 67244    National [place] on Mental Illness (FRAN)  414.653.9767 or 1.888.FRAN.HELPS        Mental Health Apps  My3  https://myswiftQueuepp.org/    VirtualHopeBox  https://[employer].org/apps/virtual-hope-box/      Additional Information  Today you were seen by a licensed mental health professional through Triage and Transition services, [facility]iders ([facility])  for a crisis assessment in the Emergency Department at [facility].  It is recommended that you follow up with your established providers (psychiatrist, mental health therapist, and/or primary care doctor - as relevant) as soon as possible. Coordinators from [facility] will be calling you in the next 24-48 hours to ensure that you have the resources you need.  You can also contact [facility] coordinators directly at [phone]. You may have been scheduled for or offered an appointment with a mental health provider. [facility] maintains an extensive network of licensed behavioral health providers to connect patients with the " services they need.  We do not charge providers a fee to participate in our referral network.  We match patients with providers based on a patient's specific needs, insurance coverage, and location.  Our first effort will be to refer you to a provider within your care system, and will utilize providers outside your care system as needed.

## 2022-06-13 ENCOUNTER — TELEPHONE (OUTPATIENT)
Dept: BEHAVIORAL HEALTH | Facility: CLINIC | Age: 57
End: 2022-06-13

## 2022-06-13 ENCOUNTER — OFFICE VISIT (OUTPATIENT)
Dept: FAMILY MEDICINE | Facility: CLINIC | Age: 57
End: 2022-06-13

## 2022-06-13 ENCOUNTER — TELEPHONE (OUTPATIENT)
Dept: BEHAVIORAL HEALTH | Facility: CLINIC | Age: 57
End: 2022-06-13
Payer: COMMERCIAL

## 2022-06-13 VITALS
TEMPERATURE: 98.1 F | HEART RATE: 81 BPM | DIASTOLIC BLOOD PRESSURE: 106 MMHG | SYSTOLIC BLOOD PRESSURE: 148 MMHG | RESPIRATION RATE: 22 BRPM | OXYGEN SATURATION: 98 % | WEIGHT: 192 LBS | BODY MASS INDEX: 30.99 KG/M2

## 2022-06-13 DIAGNOSIS — F51.04 CHRONIC INSOMNIA: ICD-10-CM

## 2022-06-13 DIAGNOSIS — F43.22 ADJUSTMENT DISORDER WITH ANXIOUS MOOD: Primary | ICD-10-CM

## 2022-06-13 DIAGNOSIS — R03.0 ELEVATED BP WITHOUT DIAGNOSIS OF HYPERTENSION: ICD-10-CM

## 2022-06-13 LAB
ATRIAL RATE - MUSE: 73 BPM
DIASTOLIC BLOOD PRESSURE - MUSE: NORMAL MMHG
INTERPRETATION ECG - MUSE: NORMAL
P AXIS - MUSE: 63 DEGREES
PR INTERVAL - MUSE: 166 MS
QRS DURATION - MUSE: 86 MS
QT - MUSE: 392 MS
QTC - MUSE: 431 MS
R AXIS - MUSE: 60 DEGREES
SYSTOLIC BLOOD PRESSURE - MUSE: NORMAL MMHG
T AXIS - MUSE: 52 DEGREES
VENTRICULAR RATE- MUSE: 73 BPM

## 2022-06-13 PROCEDURE — 99214 OFFICE O/P EST MOD 30 MIN: CPT | Performed by: STUDENT IN AN ORGANIZED HEALTH CARE EDUCATION/TRAINING PROGRAM

## 2022-06-13 RX ORDER — ESCITALOPRAM OXALATE 10 MG/1
10 TABLET ORAL DAILY
Qty: 30 TABLET | Refills: 0 | Status: SHIPPED | OUTPATIENT
Start: 2022-06-13 | End: 2022-06-21 | Stop reason: SINTOL

## 2022-06-13 ASSESSMENT — ANXIETY QUESTIONNAIRES
GAD7 TOTAL SCORE: 18
1. FEELING NERVOUS, ANXIOUS, OR ON EDGE: NEARLY EVERY DAY
IF YOU CHECKED OFF ANY PROBLEMS ON THIS QUESTIONNAIRE, HOW DIFFICULT HAVE THESE PROBLEMS MADE IT FOR YOU TO DO YOUR WORK, TAKE CARE OF THINGS AT HOME, OR GET ALONG WITH OTHER PEOPLE: SOMEWHAT DIFFICULT
6. BECOMING EASILY ANNOYED OR IRRITABLE: MORE THAN HALF THE DAYS
3. WORRYING TOO MUCH ABOUT DIFFERENT THINGS: NEARLY EVERY DAY
5. BEING SO RESTLESS THAT IT IS HARD TO SIT STILL: NEARLY EVERY DAY
7. FEELING AFRAID AS IF SOMETHING AWFUL MIGHT HAPPEN: MORE THAN HALF THE DAYS
2. NOT BEING ABLE TO STOP OR CONTROL WORRYING: NEARLY EVERY DAY
GAD7 TOTAL SCORE: 18

## 2022-06-13 ASSESSMENT — ASTHMA QUESTIONNAIRES: ACT_TOTALSCORE: 19

## 2022-06-13 ASSESSMENT — PATIENT HEALTH QUESTIONNAIRE - PHQ9
5. POOR APPETITE OR OVEREATING: MORE THAN HALF THE DAYS
SUM OF ALL RESPONSES TO PHQ QUESTIONS 1-9: 16

## 2022-06-13 NOTE — PATIENT INSTRUCTIONS
Start lexapro daily. 1/2 pill for first 4-5 days and then increase to full pill daily    OK to continue trazodone and melatonin (don't combine with lorazepam though)    Psychology appt as scheduled    Follow-up in 3 weeks, sooner if needed

## 2022-06-13 NOTE — NURSING NOTE
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 has never smoked.  Ok to leave detailed message on voice mail for today's visit only Yes, phone # [phone]

## 2022-06-13 NOTE — TELEPHONE ENCOUNTER
Referral forwarded to TC RN for follow up and scheduling. Reply sent to referral source. Referral will be marked as complete.    Keira Camp  Transition Clinic Coordinator  Date and Time: 06/13/22 7:34 AM    ----- Message from SIVA Carrillo sent at 6/12/2022  7:28 PM CDT -----  Regarding: Referral  Transition Clinic Referral   [place] Only   Limited [place] Availability    Type of Referral:      _____Therapy  _____Therapy & Medication (Psychiatry next level of care appointment needs to be scheduled)  __x___Medication Only (Psychiatry next level of care appointment needs to be scheduled)  _____Diagnostic Assessment Only      Referring Provider Name: SIVA Lux    Clinician completing the assessment. SIVA Lux    Referring Provider: TRIAGE & TRANSITION ([facility]) CLINICIAN     If known, referring provider contact name: Vale Hess; Phone Number: [phone]  Service Line/Location: Telemedicine    Reason for Transition Clinic Referral: Depression and anxiety    Next Level of Care Patient Will Be Transitioned To: Therapy and medication management    Start Date for Next Level of Care Medication (Required):    Date: Tuesday, 6/28/2022  Time: 1:00 pm - 2:00 pm  Provider: Cecilia Sarmiento MA, CNP,RN  Location: [facility], [address] [place] [address], [place] [address]  Phone: [phone]     Psychiatry cannot see patients who do not have active medical insurance    What Would Be Helpful from the Transition Clinic: Recent life changes and pt having difficulty transitioning.      Needs: NO    Does Patient Have Access to Technology: Yes    Patient E-mail Address: [email or web address]    Current Patient Phone Number: [phone]    Clinician Gender Preference (if applicable): NO    SIVA Lux

## 2022-06-13 NOTE — PROGRESS NOTES
"  Assessment & Plan       ICD-10-CM    1. Adjustment disorder with anxious mood  F43.22 escitalopram (LEXAPRO) 10 MG tablet   2. Chronic insomnia  F51.04    3. Elevated BP without diagnosis of hypertension  R03.0       Acute worsening of anxiety with recent stressors. ER notes and labs reviewed. Discussed r/b/a of tx options, plan below. Pt has therapy scheduled and aware of crisis resources. BP likely situational, prior readings wnl, continue to monitor. Close follow-up.     Patient Instructions   Start lexapro daily. 1/2 pill for first 4-5 days and then increase to full pill daily    OK to continue trazodone and melatonin (don't combine with lorazepam though)    Psychology appt as scheduled    Follow-up in 3 weeks, sooner if needed     30 minutes spent on the date of the encounter doing chart review, history and exam, documentation and further activities per the note.    Link Saleh MD, [facility]      Subjective   Kori is a 57 year old who presents for the following health issues     HPI     ED/UC Followup:    Facility:  [facility] ER  Date of visit: 6/12/2022  Reason for visit: Anxiety, stress   Current Status: better today than yesterday. Found out her workplace was closing and has been having difficulty managing stress, getting worse and worse. Sleep is really hard. Has had more anxiety since pandemic began. Has some GI distress associated as well. Hinduism and meditation helpful. \"I always want to please everyone else.\"     Has psychology appt 6/22, also did virtual visit in ED. Was prescribed lorazepam, #10 tabs, but hasn't refilled.   Has also tried trazodone and benadryl.    PHQ 6/13/2022 6/13/2022   PHQ-9 Total Score 15 16   Q9: Thoughts of better off dead/self-harm past 2 weeks Not at all Not at all     RIVKA-7 SCORE 6/13/2022 6/13/2022   Total Score - 18 (severe anxiety)   Total Score 18 18             Objective    BP (!) 148/106 (BP Location: Left arm, Patient " Position: Sitting, Cuff Size: Adult Large)   Pulse 81   Temp 98.1  F (36.7  C) (Temporal)   Resp 22   Wt 87.1 kg (192 lb)   LMP 10/16/2019   SpO2 98%   BMI 30.99 kg/m    Body mass index is 30.99 kg/m .  Physical Exam   Alert, NAD  NC/AT  Sclerae anicteric  Regular  Resp nonlabored  Skin warm and dry  No focal neuro deficits. Speech intact. Normal gait.  Appropriate affect    Admission on 06/12/2022, Discharged on 06/12/2022   Component Date Value Ref Range Status     Ventricular Rate 06/12/2022 73  BPM Final     Atrial Rate 06/12/2022 73  BPM Final     NE Interval 06/12/2022 166  ms Final     QRS Duration 06/12/2022 86  ms Final     QT 06/12/2022 392  ms Final     QTc 06/12/2022 431  ms Final     P Axis 06/12/2022 63  degrees Final     R AXIS 06/12/2022 60  degrees Final     T Axis 06/12/2022 52  degrees Final     Interpretation ECG 06/12/2022    Final                    Value:Sinus rhythm  Normal ECG  No previous ECGs available  Confirmed by - EMERGENCY ROOM, PHYSICIAN ([id number]),  NELLIE HICKMAN ([id number]) on 6/13/2022 6:44:23 AM       Sodium 06/12/2022 132 (A) 133 - 144 mmol/L Final     Potassium 06/12/2022 3.7  3.4 - 5.3 mmol/L Final     Chloride 06/12/2022 102  94 - 109 mmol/L Final     Carbon Dioxide (CO2) 06/12/2022 25  20 - 32 mmol/L Final     Anion Gap 06/12/2022 5  3 - 14 mmol/L Final     Urea Nitrogen 06/12/2022 13  7 - 30 mg/dL Final     Creatinine 06/12/2022 0.50 (A) 0.52 - 1.04 mg/dL Final     Calcium 06/12/2022 9.1  8.5 - 10.1 mg/dL Final     Glucose 06/12/2022 105 (A) 70 - 99 mg/dL Final     GFR Estimate 06/12/2022 >90  >60 mL/min/1.73m2 Final    Effective December 21, 2021 eGFRcr in adults is calculated using the 2021 CKD-EPI creatinine equation which includes age and gender (Azul et al., NEJM, DOI: 10.1056/ODWGle8922111)     TSH 06/12/2022 0.81  0.40 - 4.00 mU/L Final     Troponin I High Sensitivity 06/12/2022 5  <54 ng/L Final    This Troponin-I result was obtained using a Siemens  Dimension Vista High Sensitivity Troponin-I assay (TNIH). Effective 11/23/21, nine labs/sites in the [facility] switched from a Siemens Diamond Contemporary Troponin I assay (CTNI) to a Siemens Diamond High-Sensitivity Troponin I assay (TNIH).     Alcohol ethyl 06/12/2022 <0.01  <=0.01 g/dL Final

## 2022-06-13 NOTE — TELEPHONE ENCOUNTER
Mental Health &Addiction (MH&A)Transition Clinic (TC):     Provides Patient Support While Waiting to Access Programmatic and Outpatient MH&A Care and Provides Select Crisis Assessment Services     NURSING Referral Review  _________________________________________    This RN has reviewed this Medication Management referral to the Transition Clinic and deemed the referral   [x] Appropriate  [] Inappropriate   []Consulting     Based on the following criteria:    Pt has a psychiatric provider (or pending plan) in place for future prescribing: Yes: Date: Tuesday, 6/28/2022  Time: 1:00 pm - 2:00 pm  Provider: Cecilia Sarmiento MA, CNP,RN  Location: [facility], [address] [place] [address], [address], [place] [address]  Phone: [phone]      Timeframe until pt's scheduled psychiatry appointment is less than 6 months: Yes: ~3 weeks.       Pt takes psychiatric medications: Yes: traZODone (DESYREL) 50 MG tablet, LORazepam (ATIVAN) 1 MG tablet, Zyprexa Zydis.      Pt's goals seem to align with this temporary service: Yes: Transition Clinic to bridge psychiatric care and psychiatric medication management until next Level of Care.         Any additional pertinent information regarding this referral: Insomnia and anxiety.  Recent ED visit 6/12/22, Racing Thoughts, Stress.      Initial contact w/ patient/parent: Wtr made 1st attempt to reach pt at [phone] (Mobile) [phone] (Home Phone) [phone] (Work Phone to schedule TC Psychiatry appt to bridge care to 6/28/22 Outpt Psych appt; LVM requesting return call to The [facility] at [phone] to schedule.    George Frost RN on June 13, 2022 at 9:39 AM      The Transition [facility] phone # is [phone].    George Frost RN on June 13, 2022 at 9:33 AM    Referral forwarded to TC RN for follow up and scheduling. Reply sent to referral source. Referral will be marked as complete.     Keira Camp  Transition Clinic Coordinator  Date and  Time: 06/13/22 7:34 AM     ----- Message from SIVA Carrillo sent at 6/12/2022  7:28 PM CDT -----  Regarding: Referral  Transition Clinic Referral   [place] Only   Limited [place] Availability     Type of Referral:        _____Therapy  _____Therapy & Medication (Psychiatry next level of care appointment needs to be scheduled)  __x___Medication Only (Psychiatry next level of care appointment needs to be scheduled)  _____Diagnostic Assessment Only        Referring Provider Name: SIVA Lux     Clinician completing the assessment. SIVA Lux     Referring Provider: TRIAGE & TRANSITION ([facility]) CLINICIAN      If known, referring provider contact name: Vale Jimena; Phone Number: [phone]  Service Line/Location: Telemedicine     Reason for Transition Clinic Referral: Depression and anxiety     Next Level of Care Patient Will Be Transitioned To: Therapy and medication management     Start Date for Next Level of Care Medication (Required):    Date: Tuesday, 6/28/2022  Time: 1:00 pm - 2:00 pm  Provider: Cecilia Sarmiento MA, CNP,RN  Location: [facility], [address] [place] [address], [address], [place] [address]  Phone: [phone]      Psychiatry cannot see patients who do not have active medical insurance     What Would Be Helpful from the Transition Clinic: Recent life changes and pt having difficulty transitioning.       Needs: NO     Does Patient Have Access to Technology: Yes     Patient E-mail Address: [email or web address]     Current Patient Phone Number: [phone]     Clinician Gender Preference (if applicable): NO     SIVA Lux

## 2022-06-21 ENCOUNTER — MYC MEDICAL ADVICE (OUTPATIENT)
Dept: FAMILY MEDICINE | Facility: CLINIC | Age: 57
End: 2022-06-21

## 2022-06-21 DIAGNOSIS — F43.22 ADJUSTMENT DISORDER WITH ANXIOUS MOOD: Primary | ICD-10-CM

## 2022-06-21 RX ORDER — HYDROXYZINE HYDROCHLORIDE 25 MG/1
25 TABLET, FILM COATED ORAL 3 TIMES DAILY PRN
Qty: 30 TABLET | Refills: 0 | Status: SHIPPED | OUTPATIENT
Start: 2022-06-21 | End: 2022-07-01

## 2022-06-21 NOTE — TELEPHONE ENCOUNTER
Called and spoke to Kori. She continues to feel significant anxiety. Has #2 Ativan left. Trazodone doesn't seem to be working for her chronic insomnia. Feel like she is having side effects of diarrhea and nausea on escitalopram, seems like it got worse with increasing from 5 mg to 10 mg. She has been able to meet with therapist on 2 occasions.     Recommended change to fluoxetine 20 mg daily. Also prescribed hydroxyzine to take as needed as a non-controlled option for anxiety. Warned of side effects, drowsiness, avoid taking at same time at Ativan. Use Ativan sparingly. Continue to avoid alcohol in general. She is aware that these medications take 3-4 weeks to build up in system. Would consider Effexor as next trial as she has recent history of hot flashes as well. Concerned with diarrhea side effects of Zoloft. She currently has appt for 7/1/2022, but may need appt sooner if not improving.

## 2022-06-21 NOTE — TELEPHONE ENCOUNTER
Patient called into the CS line stating that she is feeling severe anxiety, having diarrhea and an upset stomach from the escitalopram and does not want to take this. I did inform her to stop taking this and she is asking for something else to be sent in instead. Routing to Bridgett due to Dr. Saleh being out of office, is there anything else that we can send in for the patient instead of the escitalopram for the patient to start taking today? She knows that she will need to come in soon for a follow up after starting a new medication.robert

## 2022-07-01 ENCOUNTER — OFFICE VISIT (OUTPATIENT)
Dept: FAMILY MEDICINE | Facility: CLINIC | Age: 57
End: 2022-07-01

## 2022-07-01 VITALS
DIASTOLIC BLOOD PRESSURE: 70 MMHG | TEMPERATURE: 97.3 F | WEIGHT: 187 LBS | OXYGEN SATURATION: 98 % | HEIGHT: 66 IN | SYSTOLIC BLOOD PRESSURE: 122 MMHG | HEART RATE: 69 BPM | BODY MASS INDEX: 30.05 KG/M2

## 2022-07-01 DIAGNOSIS — F43.22 ADJUSTMENT DISORDER WITH ANXIOUS MOOD: ICD-10-CM

## 2022-07-01 DIAGNOSIS — F51.04 CHRONIC INSOMNIA: ICD-10-CM

## 2022-07-01 PROCEDURE — 99213 OFFICE O/P EST LOW 20 MIN: CPT | Performed by: STUDENT IN AN ORGANIZED HEALTH CARE EDUCATION/TRAINING PROGRAM

## 2022-07-01 RX ORDER — HYDROXYZINE HYDROCHLORIDE 25 MG/1
25-50 TABLET, FILM COATED ORAL 3 TIMES DAILY PRN
Qty: 100 TABLET | Refills: 0 | Status: SHIPPED | OUTPATIENT
Start: 2022-07-01 | End: 2022-09-12

## 2022-07-01 RX ORDER — TRAZODONE HYDROCHLORIDE 50 MG/1
50 TABLET, FILM COATED ORAL AT BEDTIME
Qty: 90 TABLET | Refills: 3 | Status: SHIPPED | OUTPATIENT
Start: 2022-07-01 | End: 2023-05-09

## 2022-07-01 ASSESSMENT — ANXIETY QUESTIONNAIRES
3. WORRYING TOO MUCH ABOUT DIFFERENT THINGS: SEVERAL DAYS
7. FEELING AFRAID AS IF SOMETHING AWFUL MIGHT HAPPEN: NOT AT ALL
GAD7 TOTAL SCORE: 5
1. FEELING NERVOUS, ANXIOUS, OR ON EDGE: SEVERAL DAYS
GAD7 TOTAL SCORE: 5
2. NOT BEING ABLE TO STOP OR CONTROL WORRYING: SEVERAL DAYS
5. BEING SO RESTLESS THAT IT IS HARD TO SIT STILL: SEVERAL DAYS
6. BECOMING EASILY ANNOYED OR IRRITABLE: NOT AT ALL
IF YOU CHECKED OFF ANY PROBLEMS ON THIS QUESTIONNAIRE, HOW DIFFICULT HAVE THESE PROBLEMS MADE IT FOR YOU TO DO YOUR WORK, TAKE CARE OF THINGS AT HOME, OR GET ALONG WITH OTHER PEOPLE: SOMEWHAT DIFFICULT

## 2022-07-01 ASSESSMENT — PATIENT HEALTH QUESTIONNAIRE - PHQ9
5. POOR APPETITE OR OVEREATING: SEVERAL DAYS
SUM OF ALL RESPONSES TO PHQ QUESTIONS 1-9: 7

## 2022-07-01 NOTE — PROGRESS NOTES
Assessment & Plan       ICD-10-CM    1. Adjustment disorder with anxious mood  F43.22 FLUoxetine (PROZAC) 20 MG capsule     hydrOXYzine (ATARAX) 25 MG tablet   2. Chronic insomnia  F51.04 traZODone (DESYREL) 50 MG tablet      Anxiety improving. Discussed increasing prozac dose but agreed to defer for now, would favor eventually increasing to 40mg gradually if tolerated. Has CPE upcoming, recommend ongoing monthly follow-up until nearer to baseline. Pt in agreement.     Patient Instructions   Continue current medications    Keep up the great work on therapy, exercise etc     Enjoy your vacation!    Follow-up with Rebeca as planned       Link Saleh MD, [facility]       Subjective     Kori CRUZ José Howard is a 57 year old female who presents to clinic today for the following health issues:    HPI   Chief Complaint   Patient presents with     Recheck Medication     Mental health medication recheck      Anxiety Follow-Up    How are you doing with your anxiety since your last visit? Improved. Initially changed from lexapro to prozac due to GI side effects, feeling better now. Starting to bake more, working out, and has vacation upcoming. Also working with therapist weekly.    Are you having other symptoms that might be associated with anxiety? No    Have you had a significant life event? OTHER: job, see prior note     Are you feeling depressed? No    Do you have any concerns with your use of alcohol or other drugs? No    Social History     Tobacco Use     Smoking status: Never Smoker     Smokeless tobacco: Never Used   Substance Use Topics     Alcohol use: Not Currently     Alcohol/week: 6.0 standard drinks     Types: 6 Standard drinks or equivalent per week     Comment: glass wine with dinner     Drug use: No     RIVKA-7 SCORE 6/13/2022 6/13/2022 7/1/2022   Total Score - 18 (severe anxiety) -   Total Score 18 18 5     PHQ 6/13/2022 6/13/2022 7/1/2022   PHQ-9 Total Score 15 16 7   Q9:  "Thoughts of better off dead/self-harm past 2 weeks Not at all Not at all Not at all     Also on cipro for UTI though culture was negative. Feeling better.         Objective    /70 (BP Location: Right arm, Patient Position: Sitting, Cuff Size: Adult Large)   Pulse 69   Temp 97.3  F (36.3  C) (Temporal)   Ht 1.676 m (5' 6\")   Wt 84.8 kg (187 lb)   LMP 10/16/2019   SpO2 98%   BMI 30.18 kg/m    Body mass index is 30.18 kg/m .  Physical Exam   Alert, NAD  NC/AT  Sclerae anicteric  Regular  Resp nonlabored  Skin warm and dry  No focal neuro deficits. Speech intact. Normal gait.  Appropriate affect, anxious         "

## 2022-07-01 NOTE — PATIENT INSTRUCTIONS
Continue current medications    Keep up the great work on therapy, exercise etc     Enjoy your vacation!    Follow-up with Rebeca as planned

## 2022-07-01 NOTE — NURSING NOTE
Chief Complaint   Patient presents with     Recheck Medication     Mental health medication recheck         Pre-visit Screening:  Immunizations:  up to date  Colonoscopy:  is up to date  Mammogram: is up to date  Asthma Action Test/Plan:  NA  PHQ9:  NA  GAD7:  NA  Questioned patient about current smoking habits Pt. has never smoked.  Ok to leave detailed message on voice mail for today's visit only Yes, phone # [phone]

## 2022-07-21 ENCOUNTER — TRANSFERRED RECORDS (OUTPATIENT)
Dept: FAMILY MEDICINE | Facility: CLINIC | Age: 57
End: 2022-07-21

## 2022-07-21 LAB — MAMMOGRAM: NORMAL

## 2022-07-24 PROBLEM — E78.00 PURE HYPERCHOLESTEROLEMIA: Status: ACTIVE | Noted: 2022-07-24

## 2022-07-24 NOTE — PROGRESS NOTES
SUBJECTIVE:   CC: Kori Howard is an 57 year old woman who presents for preventive health visit.           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  Trazodone at night - 50mg  - started with hot flashes around 52 years old      Valtrex - upper left eye   1-2 a year, lack of stress     Seeing urologist in a month    Mild intermittent asthma - prn albuterol   Only used 2 x this year    Wt Readings from Last 5 Encounters:   07/25/22 86.3 kg (190 lb 3.2 oz)   07/01/22 84.8 kg (187 lb)   06/13/22 87.1 kg (192 lb)   07/21/21 85.7 kg (189 lb)   03/08/21 85.5 kg (188 lb 9.6 oz)             Today's PHQ-2 Score:   PHQ-2 ( 1999 Pfizer) 3/8/2021   Q1: Little interest or pleasure in doing things 0   Q2: Feeling down, depressed or hopeless 0   PHQ-2 Score 0   PHQ-2 Total Score (12-17 Years)- Positive if 3 or more points; Administer PHQ-A if positive 0       Abuse: Current or Past (Physical, Sexual or Emotional) - No  Do you feel safe in your environment? Yes        Reviewed orders with patient.  Reviewed health maintenance and updated orders accordingly - Yes      Lab work is in process  Labs reviewed in EPIC  BP Readings from Last 3 Encounters:   07/25/22 126/80   07/01/22 122/70   06/13/22 (!) 148/106    Wt Readings from Last 3 Encounters:   07/25/22 86.3 kg (190 lb 3.2 oz)   07/01/22 84.8 kg (187 lb)   06/13/22 87.1 kg (192 lb)                  Patient Active Problem List   Diagnosis     Family history of diabetes mellitus      Herpes simplex virus (HSV) infection     Abnormal glucose     ACP (advance care planning)     Health Care Home     Asthma, exercise induced     Other insomnia     Menopausal syndrome (hot flashes)     Pure hypercholesterolemia     Past Surgical History:   Procedure Laterality Date     BLADDER SURGERY      2009 - sling     COLONOSCOPY  01/05/2009    NORMAL/ REPEAT IN 10 years     HYSTEROSCOPY,ABLATION ENDOMETRIUM  1995     [facility] JORDAN W/O FACETEC FORAMOT/DSKC 1/2 VRT SEG, LUMBAR  1998     [facility] NONSPECIFIC PROCEDURE  1995    LEEP     [facility] COLONOSCOPY THRU STOMA, DIAGNOSTIC  01/05/2009    normal/ Stone/ repeat in 10 years       Social History     Tobacco Use     Smoking status: Former Smoker     Smokeless tobacco: Never Used     Tobacco comment: in high school   Substance Use Topics     Alcohol use: Not Currently     Family History   Problem Relation Age of Onset     Osteoarthritis Mother      Hypertension Mother      Diabetes Type 2  Father      Hypertension Father      Other - See Comments Son         heart murmur     Cancer No family hx of          Current Outpatient Medications   Medication Sig Dispense Refill     cetirizine HCl 10 MG CAPS        Cholecalciferol (VITAMIN D3) 3000 UNITS TABS        COMPOUNDED NON-CONTROLLED SUBSTANCE (CMPD RX) - PHARMACY TO MIX COMPOUNDED MEDICATION Insert 1 gram vaginally 2-3 times weekly as needed 30 g 11     cyanocobalamin (VITAMIN  B-12) 1000 MCG tablet Take 1,000 mcg by mouth daily       Emollient (COLLAGEN EX)        FISH OIL 1000 MG OR CAPS None Entered       Flaxseed, Linseed, (FLAX SEED OIL) 1000 MG capsule Take 1 capsule by mouth daily       hydrOXYzine (ATARAX) 25 MG tablet Take 1-2 tablets (25-50 mg) by mouth 3 times daily as needed for anxiety 100 tablet 0     LORazepam (ATIVAN) 1 MG tablet Take 1 tablet (1 mg) by mouth every 8 hours as needed for anxiety or sleep 10 tablet 0     magnesium 100 MG CAPS        MULTIVITAMINS OR None Entered       Probiotic Product (PROBIOTIC  ADVANCED PO)        traZODone (DESYREL) 50 MG tablet Take 1 tablet (50 mg) by mouth At Bedtime 90 tablet 3     valACYclovir (VALTREX) 1000 mg tablet 2 tablets with onset of symptoms: repeat dose in 12 hours (update refills only) 12 tablet 3     Allergies   Allergen Reactions     Escitalopram Diarrhea     Oxaprozin      daypro     Oxycodone      vomiting     Sulfa Drugs Other (See Comments)     Yeast infections     Codeine Rash     Recent Labs   Lab Test 06/12/22  1604 07/21/21  1340 03/08/21  1326 03/08/21  1227 03/08/21  1136 06/16/20  0000 04/17/19  0853 03/22/18  0847 03/22/18  0000 03/02/17  0906   A1C  --   --   --   --  5.6  --   --   --  5.3 5.6   LDL  --  108  --   --   --  108 115*   < >  --   --    HDL  --  103  --   --   --  103 94   < >  --   --    TRIG  --  58  --   --   --  56 59   < >  --   --    CR 0.50*  --  0.80  --   --   --  0.68  --   --   --    GFRESTIMATED >90  --   --   --   --   --  101  --   --   --    POTASSIUM 3.7  --  4.54  --   --   --  4.3  --   --   --    TSH 0.81  --   --  1.67  --   --   --   --   --   --     < > = values in this interval not displayed.                           Past Medical History:   Diagnosis Date     Family history of diabetes mellitus       Past Surgical History:   Procedure Laterality Date     BLADDER SURGERY      2009 - sling     COLONOSCOPY  01/05/2009    NORMAL/ REPEAT IN 10 years     HYSTEROSCOPY,ABLATION ENDOMETRIUM  1995     [facility] JORDAN W/O FACETEC FORAMOT/DSKC 1/2 VRT SEG, LUMBAR  1998     [facility] NONSPECIFIC PROCEDURE  1995    LEEP     [facility] COLONOSCOPY THRU STOMA, DIAGNOSTIC  01/05/2009    normal/ Stone/ repeat in 10 years       Review of Systems  CONSTITUTIONAL: NEGATIVE for fever, chills, change in weight  INTEGUMENTARY/SKIN: NEGATIVE for worrisome rashes, moles or lesions  EYES: NEGATIVE for vision changes or irritation  ENT: NEGATIVE for ear, mouth and throat problems  RESP: NEGATIVE for significant cough or SOB  BREAST: NEGATIVE for masses, tenderness or  "discharge  CV: NEGATIVE for chest pain, palpitations or peripheral edema  GI: NEGATIVE for nausea, abdominal pain, heartburn, or change in bowel habits  : + sling - dropping, pain in vaginal area, pain with intercourse  MUSCULOSKELETAL: NEGATIVE for significant arthralgias or myalgia  NEURO: NEGATIVE for weakness, dizziness or paresthesias  PSYCHIATRIC: NEGATIVE for changes in mood or affect      OBJECTIVE:   /80 (BP Location: Left arm, Patient Position: Sitting, Cuff Size: Adult Regular)   Pulse 68   Temp 97.4  F (36.3  C) (Temporal)   Ht 1.676 m (5' 6\")   Wt 86.3 kg (190 lb 3.2 oz)   LMP 10/16/2019   SpO2 97%   BMI 30.70 kg/m    Physical Exam  GENERAL: healthy, alert and no distress  EYES: Eyes grossly normal to inspection, PERRL and conjunctivae and sclerae normal  HENT: ear canals and TM's normal, nose and mouth without ulcers or lesions  NECK: no adenopathy, no asymmetry, masses, or scars and thyroid normal to palpation  RESP: lungs clear to auscultation - no rales, rhonchi or wheezes  BREAST: normal without masses, tenderness or nipple discharge and no palpable axillary masses or adenopathy  CV: regular rate and rhythm, normal S1 S2, no S3 or S4, no murmur, click or rub, no peripheral edema and peripheral pulses strong  ABDOMEN: soft, nontender, no hepatosplenomegaly, no masses and bowel sounds normal  Gu: normal appearance of external genitalia, vaginal mucosa, and cervix. No palpable abnormalities on bimanual exam, no cervical motion tenderness.  MS: no gross musculoskeletal defects noted, no edema  SKIN: no suspicious lesions or rashes  NEURO: Normal strength and tone, mentation intact and speech normal  PSYCH: mentation appears normal, affect normal/bright    Diagnostic Test Results:  Labs reviewed in Epic    ASSESSMENT/PLAN:   Kori was seen today for physical, recheck medication and bladder problems.    Diagnoses and all orders for this visit:    Encounter for gynecological examination " "without abnormal finding  -     VENOUS COLLECTION  -     Lipid Panel (BFP)  -     Glucose Fasting (BFP)    Pure hypercholesterolemia  -     VENOUS COLLECTION  -     Lipid Panel (BFP)    Abnormal glucose  -     VENOUS COLLECTION  -     Glucose Fasting (BFP)    Asthma, exercise induced  -     OFFICE/OUTPT VISIT,EST,LEVL III      Atrophic vaginitis  -     COMPOUNDED NON-CONTROLLED SUBSTANCE (CMPD RX) - PHARMACY TO MIX COMPOUNDED MEDICATION; Insert 1 gram vaginally 2-3 times weekly as needed  -     OFFICE/OUTPT VISIT,EST,LEVL III  Start using every other day, spread on labia and in vagina  Astroglide with intercourse    Herpes simplex virus infection  -     valACYclovir (VALTREX) 1000 mg tablet; 2 tablets with onset of symptoms: repeat dose in 12 hours (update refills only)  -     OFFICE/OUTPT VISIT,EST,LEVL III    Obesity (BMI 30.0-34.9)  -     VENOUS COLLECTION  -     Lipid Panel (BFP)  -     Glucose Fasting (BFP)    Situational anxiety  -     FLUoxetine (PROZAC) 10 MG capsule; Take 1 capsule (10 mg) by mouth daily  -     OFFICE/OUTPT VISIT,EST,LEVL III  Continue therapy  Dec to 10 mg, switch to night   See me in 4 weeks recheck        Patient has been advised of split billing requirements and indicates understanding: Yes    COUNSELING:  Reviewed preventive health counseling, as reflected in patient instructions    Estimated body mass index is 30.7 kg/m  as calculated from the following:    Height as of this encounter: 1.676 m (5' 6\").    Weight as of this encounter: 86.3 kg (190 lb 3.2 oz).    Weight management plan: Discussed healthy diet and exercise guidelines    She reports that she has quit smoking. She has never used smokeless tobacco.      Counseling Resources:  ATP IV Guidelines  Pooled Cohorts Equation Calculator  Breast Cancer Risk Calculator  BRCA-Related Cancer Risk Assessment: FHS-7 Tool  FRAX Risk Assessment  ICSI Preventive Guidelines  Dietary Guidelines for Americans, 2010  USDA's MyPlate  ASA " Prophylaxis  Lung CA Screening    DEXTER Pope  [facility]

## 2022-07-25 ENCOUNTER — OFFICE VISIT (OUTPATIENT)
Dept: FAMILY MEDICINE | Facility: CLINIC | Age: 57
End: 2022-07-25

## 2022-07-25 ENCOUNTER — TELEPHONE (OUTPATIENT)
Dept: FAMILY MEDICINE | Facility: CLINIC | Age: 57
End: 2022-07-25

## 2022-07-25 VITALS
DIASTOLIC BLOOD PRESSURE: 80 MMHG | TEMPERATURE: 97.4 F | WEIGHT: 190.2 LBS | SYSTOLIC BLOOD PRESSURE: 126 MMHG | BODY MASS INDEX: 30.57 KG/M2 | HEART RATE: 68 BPM | OXYGEN SATURATION: 97 % | HEIGHT: 66 IN

## 2022-07-25 DIAGNOSIS — Z01.419 ENCOUNTER FOR GYNECOLOGICAL EXAMINATION WITHOUT ABNORMAL FINDING: Primary | ICD-10-CM

## 2022-07-25 DIAGNOSIS — F41.8 SITUATIONAL ANXIETY: ICD-10-CM

## 2022-07-25 DIAGNOSIS — N95.2 ATROPHIC VAGINITIS: ICD-10-CM

## 2022-07-25 DIAGNOSIS — B00.9 HERPES SIMPLEX VIRUS (HSV) INFECTION: ICD-10-CM

## 2022-07-25 DIAGNOSIS — E78.00 PURE HYPERCHOLESTEROLEMIA: ICD-10-CM

## 2022-07-25 DIAGNOSIS — R73.09 ABNORMAL GLUCOSE: ICD-10-CM

## 2022-07-25 DIAGNOSIS — E66.811 OBESITY (BMI 30.0-34.9): ICD-10-CM

## 2022-07-25 DIAGNOSIS — J45.990 ASTHMA, EXERCISE INDUCED: ICD-10-CM

## 2022-07-25 DIAGNOSIS — B00.9 HERPES SIMPLEX VIRUS INFECTION: ICD-10-CM

## 2022-07-25 LAB
CHOLEST SERPL-MCNC: 190 MG/DL (ref 0–199)
CHOLEST/HDLC SERPL: 2 {RATIO} (ref 0–5)
GLUCOSE SERPL-MCNC: 99 MG/DL (ref 60–99)
HDLC SERPL-MCNC: 83 MG/DL (ref 40–150)
LDLC SERPL CALC-MCNC: 92 MG/DL (ref 0–130)
TRIGL SERPL-MCNC: 77 MG/DL (ref 0–149)

## 2022-07-25 PROCEDURE — 99213 OFFICE O/P EST LOW 20 MIN: CPT | Mod: 25 | Performed by: PHYSICIAN ASSISTANT

## 2022-07-25 PROCEDURE — 82947 ASSAY GLUCOSE BLOOD QUANT: CPT | Performed by: PHYSICIAN ASSISTANT

## 2022-07-25 PROCEDURE — 36415 COLL VENOUS BLD VENIPUNCTURE: CPT | Performed by: PHYSICIAN ASSISTANT

## 2022-07-25 PROCEDURE — 99396 PREV VISIT EST AGE 40-64: CPT | Performed by: PHYSICIAN ASSISTANT

## 2022-07-25 PROCEDURE — 80061 LIPID PANEL: CPT | Performed by: PHYSICIAN ASSISTANT

## 2022-07-25 RX ORDER — VALACYCLOVIR HYDROCHLORIDE 1 G/1
TABLET, FILM COATED ORAL
Qty: 12 TABLET | Refills: 3 | Status: SHIPPED | OUTPATIENT
Start: 2022-07-25 | End: 2023-06-26

## 2022-07-25 RX ORDER — FLUOXETINE 10 MG/1
10 CAPSULE ORAL DAILY
Qty: 90 CAPSULE | Refills: 0 | Status: SHIPPED | OUTPATIENT
Start: 2022-07-25 | End: 2022-08-25

## 2022-07-25 ASSESSMENT — ASTHMA QUESTIONNAIRES: ACT_TOTALSCORE: 24

## 2022-07-25 NOTE — NURSING NOTE
Chief Complaint   Patient presents with     Physical     Fasting today      Recheck Medication     Discuss weaning off medications, makes her feel like a zombie     Bladder Problems     Has been seeing urology for ongoing bladder issues     Pre-visit Screening:  Immunizations:  up to date  Colonoscopy:  is up to date  Mammogram: is up to date  Asthma Action Test/Plan:  NA  PHQ9:  NA  GAD7:  NA  Questioned patient about current smoking habits Pt. has never smoked.  Ok to leave detailed message on voice mail for today's visit only Yes, phone # [phone]

## 2022-07-25 NOTE — TELEPHONE ENCOUNTER
"[facility] called stating they received a script today with no drug name or \"mixture\" in the SIG. They would like this resent. I pended what was sent previously     Thanks, Moni CORRALES  " Normal rate, regular rhythm.  Heart sounds S1, S2.  No murmurs, rubs or gallops. + right upper chest ttp, reproducible with lateral rotation of trunk and shoulder shrug on right side, no le edema b/l

## 2022-07-25 NOTE — LETTER
My Asthma Action Plan    Name: Kori Howard   YOB: 1965  Date: 7/25/2022   My doctor: DEXTER Pope   My clinic: [facility]        My Rescue Medicine:   Albuterol inhaler (Proair/Ventolin/Proventil HFA)  2-4 puffs EVERY 4 HOURS as needed. Use a spacer if recommended by your provider.   My Asthma Severity:   Intermittent / Exercise Induced  Know your asthma triggers: exercise, smells             GREEN ZONE   Good Control    I feel good    No cough or wheeze    Can work, sleep and play without asthma symptoms       Take your asthma control medicine every day.     1. If exercise triggers your asthma, take your rescue medication    15 minutes before exercise or sports, and    During exercise if you have asthma symptoms  2. Spacer to use with inhaler: If you have a spacer, make sure to use it with your inhaler             YELLOW ZONE Getting Worse  I have ANY of these:    I do not feel good    Cough or wheeze    Chest feels tight    Wake up at night   1. Keep taking your Green Zone medications  2. Start taking your rescue medicine:    every 20 minutes for up to 1 hour. Then every 4 hours for 24-48 hours.  3. If you stay in the Yellow Zone for more than 12-24 hours, contact your doctor.  4. If you do not return to the Green Zone in 12-24 hours or you get worse, start taking your oral steroid medicine if prescribed by your provider.           RED ZONE Medical Alert - Get Help  I have ANY of these:    I feel awful    Medicine is not helping    Breathing getting harder    Trouble walking or talking    Nose opens wide to breathe       1. Take your rescue medicine NOW  2. If your provider has prescribed an oral steroid medicine, start taking it NOW  3. Call your doctor NOW  4. If you are still in the Red Zone after 20 minutes and you have not reached your doctor:    Take your rescue medicine again and    Call 911 or go to the emergency room right away    See your  regular doctor within 2 weeks of an Emergency Room or Urgent Care visit for follow-up treatment.          Annual Reminders:  Meet with Asthma Educator,  Flu Shot in the Fall, consider Pneumonia Vaccination for patients with asthma (aged 19 and older).    Pharmacy:    [facility]) - [place] - [address] [facility] [address] [facility] [employer] - [place], [place] - [address] [place] [address] [address]    Electronically signed by DEXTER Pope   Date: 07/25/22                    Asthma Triggers  How To Control Things That Make Your Asthma Worse    Triggers are things that make your asthma worse.  Look at the list below to help you find your triggers and   what you can do about them. You can help prevent asthma flare-ups by staying away from your triggers.      Trigger                                                          What you can do   Cigarette Smoke  Tobacco smoke can make asthma worse. Do not allow smoking in your home, car or around you.  Be sure no one smokes at a child s day care or school.  If you smoke, ask your health care provider for ways to help you quit.  Ask family members to quit too.  Ask your health care provider for a referral to Quit Plan to help you quit smoking, or call 6-988-388-PLAN.     Colds, Flu, Bronchitis  These are common triggers of asthma. Wash your hands often.  Don t touch your eyes, nose or mouth.  Get a flu shot every year.     Dust Mites  These are tiny bugs that live in cloth or carpet. They are too small to see. Wash sheets and blankets in hot water every week.   Encase pillows and mattress in dust mite proof covers.  Avoid having carpet if you can. If you have carpet, vacuum weekly.   Use a dust mask and HEPA vacuum.   Pollen and Outdoor Mold  Some people are allergic to trees, grass, or weed pollen, or molds. Try to keep your windows closed.  Limit time out doors when pollen count is high.   Ask you health care provider about taking medicine during  allergy season.     Animal Dander  Some people are allergic to skin flakes, urine or saliva from pets with fur or feathers. Keep pets with fur or feathers out of your home.    If you can t keep the pet outdoors, then keep the pet out of your bedroom.  Keep the bedroom door closed.  Keep pets off cloth furniture and away from stuffed toys.     Mice, Rats, and Cockroaches  Some people are allergic to the waste from these pests.   Cover food and garbage.  Clean up spills and food crumbs.  Store grease in the refrigerator.   Keep food out of the bedroom.   Indoor Mold  This can be a trigger if your home has high moisture. Fix leaking faucets, pipes, or other sources of water.   Clean moldy surfaces.  Dehumidify basement if it is damp and smelly.   Smoke, Strong Odors, and Sprays  These can reduce air quality. Stay away from strong odors and sprays, such as perfume, powder, hair spray, paints, smoke incense, paint, cleaning products, candles and new carpet.   Exercise or Sports  Some people with asthma have this trigger. Be active!  Ask your doctor about taking medicine before sports or exercise to prevent symptoms.    Warm up for 5-10 minutes before and after sports or exercise.     Other Triggers of Asthma  Cold air:  Cover your nose and mouth with a scarf.  Sometimes laughing or crying can be a trigger.  Some medicines and food can trigger asthma.

## 2022-08-25 ENCOUNTER — OFFICE VISIT (OUTPATIENT)
Dept: FAMILY MEDICINE | Facility: CLINIC | Age: 57
End: 2022-08-25

## 2022-08-25 VITALS
HEIGHT: 66 IN | WEIGHT: 186 LBS | TEMPERATURE: 97.3 F | SYSTOLIC BLOOD PRESSURE: 116 MMHG | DIASTOLIC BLOOD PRESSURE: 78 MMHG | HEART RATE: 67 BPM | OXYGEN SATURATION: 97 % | BODY MASS INDEX: 29.89 KG/M2

## 2022-08-25 DIAGNOSIS — F41.8 SITUATIONAL ANXIETY: ICD-10-CM

## 2022-08-25 PROCEDURE — 99213 OFFICE O/P EST LOW 20 MIN: CPT | Performed by: PHYSICIAN ASSISTANT

## 2022-08-25 RX ORDER — FLUOXETINE 10 MG/1
10 CAPSULE ORAL DAILY
Qty: 90 CAPSULE | Refills: 1 | Status: SHIPPED | OUTPATIENT
Start: 2022-08-25 | End: 2023-05-09

## 2022-08-25 ASSESSMENT — ANXIETY QUESTIONNAIRES
6. BECOMING EASILY ANNOYED OR IRRITABLE: NOT AT ALL
2. NOT BEING ABLE TO STOP OR CONTROL WORRYING: SEVERAL DAYS
3. WORRYING TOO MUCH ABOUT DIFFERENT THINGS: SEVERAL DAYS
7. FEELING AFRAID AS IF SOMETHING AWFUL MIGHT HAPPEN: NOT AT ALL
GAD7 TOTAL SCORE: 4
GAD7 TOTAL SCORE: 4
5. BEING SO RESTLESS THAT IT IS HARD TO SIT STILL: SEVERAL DAYS
1. FEELING NERVOUS, ANXIOUS, OR ON EDGE: SEVERAL DAYS
IF YOU CHECKED OFF ANY PROBLEMS ON THIS QUESTIONNAIRE, HOW DIFFICULT HAVE THESE PROBLEMS MADE IT FOR YOU TO DO YOUR WORK, TAKE CARE OF THINGS AT HOME, OR GET ALONG WITH OTHER PEOPLE: SOMEWHAT DIFFICULT

## 2022-08-25 ASSESSMENT — PATIENT HEALTH QUESTIONNAIRE - PHQ9
SUM OF ALL RESPONSES TO PHQ QUESTIONS 1-9: 4
5. POOR APPETITE OR OVEREATING: NOT AT ALL

## 2022-08-25 NOTE — NURSING NOTE
Chief Complaint   Patient presents with     Recheck Medication     Follow up with anxiety medication         Pre-visit Screening:  Immunizations:  up to date  Colonoscopy:  is up to date  Mammogram: is up to date  Asthma Action Test/Plan:  NA  PHQ9:  NA  GAD7:  NA  Questioned patient about current smoking habits Pt. has never smoked.  Ok to leave detailed message on voice mail for today's visit only Yes, phone # [phone]

## 2022-08-25 NOTE — PROGRESS NOTES
"Assessment & Plan     Situational anxiety  Controlled.   Continue current medication(s) at current dose(s).  - FLUoxetine (PROZAC) 10 MG capsule  Dispense: 90 capsule; Refill: 1    Cherrie advised no intercourse for 12 hours after topical estrogen application.     FUTURE APPOINTMENTS:       - Follow-up visit in 6 months        DEXTER Pope  [facility]    Subjective     Nursing Notes:   Marion Rosa  8/25/2022 11:10 AM  Signed  Chief Complaint   Patient presents with     Recheck Medication     Follow up with anxiety medication         Pre-visit Screening:  Immunizations:  up to date  Colonoscopy:  is up to date  Mammogram: is up to date  Asthma Action Test/Plan:  NA  PHQ9:  NA  GAD7:  NA  Questioned patient about current smoking habits Pt. has never smoked.  Ok to leave detailed message on voice mail for today's visit only Yes, phone # [phone]                  Kori Howard is a 57 year old female who presents to clinic today for the following health issues     HPI         Anxiety Follow-Up - dec Prozac to 10 mg - take at night - seeing therapist    How are you doing with your anxiety since your last visit? Improved - did have one panic attack when signing contract at [employer]    Are you having other symptoms that might be associated with anxiety? No    Have you had a significant life event? OTHER: job     Are you feeling depressed? No    Do you have any concerns with your use of alcohol or other drugs? No     Estrogen topical - is concerned about Estrogen on his penis      Lexapro initially - diarreha/stomach issues  Anxiety worsened    Family history:  MGM with possibly depression from so many    Sister is \"crazy\"  Has dementia  Boyfriend suppportinger  halluciantions - won't go in for eval schizophrenia    Seeing therapist as needed        Social History     Tobacco Use     Smoking status: Former Smoker     Smokeless tobacco: Never Used     Tobacco " comment: in high school   Substance Use Topics     Alcohol use: Not Currently     Drug use: No     RIVKA-7 SCORE 6/13/2022 7/1/2022 8/25/2022   Total Score 18 (severe anxiety) - -   Total Score 18 5 4     PHQ 6/13/2022 7/1/2022 8/25/2022   PHQ-9 Total Score 16 7 4   Q9: Thoughts of better off dead/self-harm past 2 weeks Not at all Not at all Not at all           Current Medications  Current Outpatient Medications   Medication Sig Dispense Refill     cetirizine HCl 10 MG CAPS        Cholecalciferol (VITAMIN D3) 3000 UNITS TABS        COMPOUNDED NON-CONTROLLED SUBSTANCE (CMPD RX) - PHARMACY TO MIX COMPOUNDED MEDICATION Insert 1 gram vaginally 2-3 times weekly as needed 30 g 11     COMPOUNDED NON-CONTROLLED SUBSTANCE (CMPD RX) - PHARMACY TO MIX COMPOUNDED MEDICATION Estradiol 0.02% cream (contains 0.2mg estradiol/gm of cream)- apply 1 gram daily to the vaginal area every other day 30 g 11     cyanocobalamin (VITAMIN  B-12) 1000 MCG tablet Take 1,000 mcg by mouth daily       Emollient (COLLAGEN EX)        FISH OIL 1000 MG OR CAPS None Entered       Flaxseed, Linseed, (FLAX SEED OIL) 1000 MG capsule Take 1 capsule by mouth daily       FLUoxetine (PROZAC) 10 MG capsule Take 1 capsule (10 mg) by mouth daily 90 capsule 1     hydrOXYzine (ATARAX) 25 MG tablet Take 1-2 tablets (25-50 mg) by mouth 3 times daily as needed for anxiety 100 tablet 0     LORazepam (ATIVAN) 1 MG tablet Take 1 tablet (1 mg) by mouth every 8 hours as needed for anxiety or sleep 10 tablet 0     magnesium 100 MG CAPS        MULTIVITAMINS OR None Entered       Probiotic Product (PROBIOTIC ADVANCED PO)        traZODone (DESYREL) 50 MG tablet Take 1 tablet (50 mg) by mouth At Bedtime 90 tablet 3     valACYclovir (VALTREX) 1000 mg tablet 2 tablets with onset of symptoms: repeat dose in 12 hours (update refills only) 12 tablet 3         Constitutional, HEENT, Cardiovascular, Pulmonary, GI and  systems are negative, except as otherwise noted.         "  Objective    /78 (BP Location: Right arm, Patient Position: Sitting, Cuff Size: Adult Large)   Pulse 67   Temp 97.3  F (36.3  C) (Temporal)   Ht 1.676 m (5' 6\")   Wt 84.4 kg (186 lb)   LMP 10/16/2019   SpO2 97%   BMI 30.02 kg/m    Body mass index is 30.02 kg/m .  Physical Exam   GENERAL: healthy, alert and no distress        Mental Status Exam:   Appearance: calm  Grooming: adequately groomed  Demeanor: engaged, cooperative  Affect: normal  Speech: Normal.  Gait:Normal.  Movements: Normal  Form of thought: Logical, Linear and Goal directed  Thought content:  Normal  Insight:Good   Judgment: Good   Cognition: Good       "

## 2022-08-28 ENCOUNTER — MYC MEDICAL ADVICE (OUTPATIENT)
Dept: FAMILY MEDICINE | Facility: CLINIC | Age: 57
End: 2022-08-28

## 2022-09-12 ENCOUNTER — MYC REFILL (OUTPATIENT)
Dept: FAMILY MEDICINE | Facility: CLINIC | Age: 57
End: 2022-09-12

## 2022-09-12 DIAGNOSIS — F43.22 ADJUSTMENT DISORDER WITH ANXIOUS MOOD: ICD-10-CM

## 2022-09-12 RX ORDER — HYDROXYZINE HYDROCHLORIDE 25 MG/1
25-50 TABLET, FILM COATED ORAL 3 TIMES DAILY PRN
Qty: 100 TABLET | Refills: 0 | Status: SHIPPED | OUTPATIENT
Start: 2022-09-12 | End: 2022-12-13

## 2022-09-12 NOTE — TELEPHONE ENCOUNTER
Please advise pt last seen 08/25/22.    Kori Howard is requesting a refill of:    Pending Prescriptions:                       Disp   Refills    hydrOXYzine (ATARAX) 25 MG tablet         100 ta*0            Sig: Take 1-2 tablets (25-50 mg) by mouth 3 times           daily as needed for anxiety

## 2022-10-15 ENCOUNTER — HEALTH MAINTENANCE LETTER (OUTPATIENT)
Age: 57
End: 2022-10-15

## 2022-12-13 ENCOUNTER — OFFICE VISIT (OUTPATIENT)
Dept: FAMILY MEDICINE | Facility: CLINIC | Age: 57
End: 2022-12-13

## 2022-12-13 VITALS
HEIGHT: 65 IN | RESPIRATION RATE: 22 BRPM | BODY MASS INDEX: 30.16 KG/M2 | TEMPERATURE: 98 F | DIASTOLIC BLOOD PRESSURE: 78 MMHG | SYSTOLIC BLOOD PRESSURE: 126 MMHG | OXYGEN SATURATION: 98 % | HEART RATE: 83 BPM | WEIGHT: 181 LBS

## 2022-12-13 DIAGNOSIS — E66.811 OBESITY (BMI 30.0-34.9): ICD-10-CM

## 2022-12-13 DIAGNOSIS — F41.1 GAD (GENERALIZED ANXIETY DISORDER): ICD-10-CM

## 2022-12-13 DIAGNOSIS — S83.206D TEAR OF MENISCUS OF RIGHT KNEE AS CURRENT INJURY, UNSPECIFIED MENISCUS, UNSPECIFIED TEAR TYPE, SUBSEQUENT ENCOUNTER: ICD-10-CM

## 2022-12-13 DIAGNOSIS — Z01.818 PRE-OPERATIVE GENERAL PHYSICAL EXAMINATION: Primary | ICD-10-CM

## 2022-12-13 DIAGNOSIS — H02.30 EXCESS SKIN OF EYELID, UNSPECIFIED LATERALITY: ICD-10-CM

## 2022-12-13 LAB — HEMOGLOBIN: 13.9 G/DL (ref 11.7–15.7)

## 2022-12-13 PROCEDURE — 36415 COLL VENOUS BLD VENIPUNCTURE: CPT | Performed by: STUDENT IN AN ORGANIZED HEALTH CARE EDUCATION/TRAINING PROGRAM

## 2022-12-13 PROCEDURE — 99214 OFFICE O/P EST MOD 30 MIN: CPT | Performed by: STUDENT IN AN ORGANIZED HEALTH CARE EDUCATION/TRAINING PROGRAM

## 2022-12-13 PROCEDURE — 85018 HEMOGLOBIN: CPT | Performed by: STUDENT IN AN ORGANIZED HEALTH CARE EDUCATION/TRAINING PROGRAM

## 2022-12-13 PROCEDURE — 80048 BASIC METABOLIC PNL TOTAL CA: CPT | Performed by: STUDENT IN AN ORGANIZED HEALTH CARE EDUCATION/TRAINING PROGRAM

## 2022-12-13 NOTE — PROGRESS NOTES
[facility]  [address] [place] [address]  [address]  [facility] [address]  Phone: [phone]  Fax: [phone]  Primary Provider: Bridgett Decker  Pre-op Performing Provider: KEVIN OCAMPO      PREOPERATIVE EVALUATION:  Today's date: 12/13/2022    Kori Howard is a 57 year old female who presents for a preoperative evaluation.    Surgical Information:  Surgery/Procedure: bilateral blepharoplasty, R knee arthroscopy  Surgery Location: [facility] for knee and [facility]   Surgeon: Dr. Echols for eye surgery and Dr. Rogers for knee   Surgery Date: 12/20/22 for blepharoplasty and 1/6/23 for knee surgery   Time of Surgery: TBD  Where patient plans to recover: At home with family  Fax number for surgical facility: [phone] for EYE and [phone] for KNEE    Type of Anesthesia Anticipated: to be determined    Assessment & Plan     The proposed surgical procedure is considered LOW/INTERMEDIATE risk.      ICD-10-CM    1. Pre-operative general physical examination  Z01.818       2. Tear of meniscus of right knee as current injury, unspecified meniscus, unspecified tear type, subsequent encounter  S83.206D       3. Obesity (BMI 30.0-34.9)  E66.9 Basic Metabolic Panel (BFP)     HEMOGLOBIN (BFP)      4. RIVKA (generalized anxiety disorder)  F41.1       5. Excess skin of eyelid, unspecified laterality  H02.30            Risks and Recommendations:  The patient has the following additional risks and recommendations for perioperative complications:   - No identified additional risk factors other than previously addressed    Medication Instructions:  Patient is to take all scheduled medications on the day of surgery EXCEPT for modifications listed below:  Patient Instructions   Blood work today    Stop fish oil and vitamins one week before each surgery    Avoid NSAIDs for one week before each surgery    Tylenol safe to take if needed for pain    Call  with any questions or concerns        RECOMMENDATION:  APPROVAL GIVEN to proceed with proposed procedures, without further diagnostic evaluation.      Link Saleh MD, [facility]         Subjective     HPI related to upcoming procedure: Two surgeries in next month as above, otherwise in good health.     1. No - Have you ever had a heart attack or stroke?  2. No - Have you ever had surgery on your heart or blood vessels, such as a stent, coronary (heart) bypass, or surgery on an artery in the head, neck, heart, or legs?  3. No - Do you have chest pain when you are physically active?  4. No - Do you have a history of heart failure?  5. No - Do you currently have a cold, bronchitis, or symptoms of other respiratory (head and chest) infections?  6. No - Do you have a cough, shortness of breath, or wheezing?  7. No - Do you or anyone in your family have a history of blood clots?  8. No - Do you or anyone in your family have a serious bleeding problem, such as long-lasting bleeding after surgeries or cuts?  9. No - Have you ever had anemia or been told to take iron pills?  10. No - Have you had any abnormal blood loss such as black, tarry or bloody stools, or abnormal vaginal bleeding?  11. No - Have you ever had a blood transfusion?  12. Yes - Are you willing to have a blood transfusion if it is medically needed before, during, or after your surgery?  13. No - Have you or anyone in your family ever had problems with anesthesia (sedation for surgery)?  14. No - Do you have sleep apnea, excessive snoring, or daytime drowsiness?   15. No - Do you have any artifical heart valves or other implanted medical devices, such as a pacemaker, defibrillator, or continuous glucose monitor?  16. No - Do you have any artifical joints?  17. No - Are you allergic to latex?  18. No - Is there any chance that you may be pregnant?    Health Care Directive:  Patient does not have a Health Care Directive or Living  Will: Discussed advance care planning with patient; information given to patient to review.    Status of Chronic Conditions:  See problem list for active medical problems.  Problems all longstanding and stable, except as noted/documented.  See ROS for pertinent symptoms related to these conditions.    Review of Systems  12 point ROS performed and negative for new concerns except as mentioned above     Patient Active Problem List    Diagnosis Date Noted     Pure hypercholesterolemia 07/24/2022     Priority: Medium     Menopausal syndrome (hot flashes) 03/22/2018     Priority: Medium     Other insomnia 11/30/2015     Priority: Medium     Asthma, exercise induced 01/23/2013     Priority: Medium     Abnormal glucose 01/05/2011     Priority: Medium     Problem list name updated by automated process. Provider to review       Herpes simplex virus (HSV) infection 12/04/2008     Priority: Medium     Problem list name updated by automated process. Provider to review       Family history of diabetes mellitus      Priority: Medium     Health Care Home 10/25/2012     Priority: Low     State Tier Level:  Tier 1  Status:  None  Care Coordinator:      See Letters for [facility] Care Plan           ACP (advance care planning) 01/18/2012     Priority: Low     Advance Care Planning 2/10/2016: ACP Review of Chart / Resources Provided:  Reviewed chart for advance care plan.  Kori Howard has no plan or code status on file. Discussed available resources and provided with information. Confirmed code status reflects current choices pending further ACP discussions.  Confirmed/documented legally designated decision makers.  Added by Nicki Ellis                  Diagnosis updated by automated process. Provider to review and confirm.          Past Medical History:   Diagnosis Date     Family history of diabetes mellitus      Past Surgical History:   Procedure Laterality Date     BLADDER SURGERY      2009 - sling     COLONOSCOPY   01/05/2009    NORMAL/ REPEAT IN 10 years     HYSTEROSCOPY,ABLATION ENDOMETRIUM  1995     [facility] JORDAN W/O FACETEC FORAMOT/DSKC 1/2 VRT SEG, LUMBAR  1998     [facility] NONSPECIFIC PROCEDURE  1995    LEEP     [facility] COLONOSCOPY THRU STOMA, DIAGNOSTIC  01/05/2009    normal/ Stone/ repeat in 10 years     Current Outpatient Medications   Medication Sig Dispense Refill     cetirizine HCl 10 MG CAPS        Cholecalciferol (VITAMIN D3) 3000 UNITS TABS        COMPOUNDED NON-CONTROLLED SUBSTANCE (CMPD RX) - PHARMACY TO MIX COMPOUNDED MEDICATION Insert 1 gram vaginally 2-3 times weekly as needed 30 g 11     COMPOUNDED NON-CONTROLLED SUBSTANCE (CMPD RX) - PHARMACY TO MIX COMPOUNDED MEDICATION Estradiol 0.02% cream (contains 0.2mg estradiol/gm of cream)- apply 1 gram daily to the vaginal area every other day 30 g 11     cyanocobalamin (VITAMIN  B-12) 1000 MCG tablet Take 1,000 mcg by mouth daily       Emollient (COLLAGEN EX)        FISH OIL 1000 MG OR CAPS None Entered       Flaxseed, Linseed, (FLAX SEED OIL) 1000 MG capsule Take 1 capsule by mouth daily       FLUoxetine (PROZAC) 10 MG capsule Take 1 capsule (10 mg) by mouth daily 90 capsule 1     magnesium 100 MG CAPS        MULTIVITAMINS OR None Entered       Probiotic Product (PROBIOTIC ADVANCED PO)        traZODone (DESYREL) 50 MG tablet Take 1 tablet (50 mg) by mouth At Bedtime 90 tablet 3     valACYclovir (VALTREX) 1000 mg tablet 2 tablets with onset of symptoms: repeat dose in 12 hours (update refills only) (Patient not taking: Reported on 12/13/2022) 12 tablet 3       Allergies   Allergen Reactions     Escitalopram Diarrhea     Oxaprozin      daypro     Oxycodone      vomiting     Sulfa Drugs Other (See Comments)     Yeast infections     Codeine Rash        Social History     Tobacco Use     Smoking status: Former     Smokeless tobacco: Never     Tobacco comments:     in high school   Substance Use Topics     Alcohol use: Not Currently     Family History   Problem Relation Age of  "Onset     Osteoarthritis Mother      Hypertension Mother      Diabetes Type 2  Father      Hypertension Father      Other - See Comments Son         heart murmur     Cancer No family hx of      History   Drug Use No         Objective     /78 (BP Location: Left arm, Patient Position: Sitting, Cuff Size: Adult Large)   Pulse 83   Temp 98  F (36.7  C) (Temporal)   Resp 22   Ht 1.651 m (5' 5\")   Wt 82.1 kg (181 lb)   LMP 10/16/2019   SpO2 98%   BMI 30.12 kg/m      Physical Exam  GENERAL APPEARANCE: healthy, alert and no distress  EYES: Eyes grossly normal to inspection, PERRL and conjunctivae and sclerae normal  HENT: ear canals and TM's normal and nose and mouth without ulcers or lesions  NECK: no adenopathy, no asymmetry, masses, or scars and thyroid normal to palpation  RESP: lungs clear to auscultation - no rales, rhonchi or wheezes  CV: regular rate and rhythm, normal S1 S2, no S3 or S4 and no murmur, click or rub   ABDOMEN: soft, nontender, no HSM or masses and bowel sounds normal  NEURO: Normal strength and tone, sensory exam grossly normal, mentation intact and speech normal  PSYCH: mentation appears normal and affect normal/bright    Diagnostics:  Results for orders placed or performed in visit on 12/13/22   Basic Metabolic Panel (BFP)     Status: Abnormal   Result Value Ref Range    Carbon Dioxide 30.2 20 - 32 mmol/L    Creatinine 0.72 0.60 - 1.30 mg/dL    Glucose 122 (A) 60 - 99 mg/dL    Sodium 137.9 135 - 146 mmol/L    Potassium 4.33 3.5 - 5.3 mmol/L    Chloride 101.7 98 - 110 mmol/L    Urea Nitrogen 14 7 - 25 mg/dL    Calcium 9.8 8.6 - 10.3 mg/dL    BUN/Creatinine Ratio 19.4 6 - 22   HEMOGLOBIN (BFP)     Status: None   Result Value Ref Range    Hemoglobin 13.9 11.7 - 15.7 g/dL       No EKG required, no history of coronary heart disease, significant arrhythmia, peripheral arterial disease or other structural heart disease.    Revised Cardiac Risk Index (RCRI):  The patient has the following " serious cardiovascular risks for perioperative complications:   - No serious cardiac risks = 0 points     RCRI Interpretation: 0 points: Class I (very low risk - 0.4% complication rate)           Signed Electronically by: KEVIN OCAMPO MD  Copy of this evaluation report is provided to requesting physician.

## 2022-12-13 NOTE — PATIENT INSTRUCTIONS
Blood work today    Stop fish oil and vitamins one week before each surgery    Avoid NSAIDs for one week before each surgery    Tylenol safe to take if needed for pain    Call with any questions or concerns

## 2022-12-13 NOTE — NURSING NOTE
Chief Complaint   Patient presents with     Pre-Op Exam     Eyelid surgery being done on 12/20, DOS for right knee debridement on meniscus is 1/6/23

## 2022-12-14 LAB
BUN SERPL-MCNC: 14 MG/DL (ref 7–25)
BUN/CREATININE RATIO: 19.4 (ref 6–22)
CALCIUM SERPL-MCNC: 9.8 MG/DL (ref 8.6–10.3)
CHLORIDE SERPLBLD-SCNC: 101.7 MMOL/L (ref 98–110)
CO2 SERPL-SCNC: 30.2 MMOL/L (ref 20–32)
CREAT SERPL-MCNC: 0.72 MG/DL (ref 0.6–1.3)
GLUCOSE SERPL-MCNC: 122 MG/DL (ref 60–99)
POTASSIUM SERPL-SCNC: 4.33 MMOL/L (ref 3.5–5.3)
SODIUM SERPL-SCNC: 137.9 MMOL/L (ref 135–146)

## 2023-02-02 DIAGNOSIS — F41.8 SITUATIONAL ANXIETY: ICD-10-CM

## 2023-02-02 RX ORDER — FLUOXETINE 10 MG/1
CAPSULE ORAL
Qty: 90 CAPSULE | Refills: 3 | COMMUNITY
Start: 2023-02-02

## 2023-02-02 NOTE — TELEPHONE ENCOUNTER
Kori Howard is requesting a refill of:    Refused Prescriptions:                       Disp   Refills    FLUoxetine (PROZAC) 10 MG capsule [Pharmac*90 cap*3        Sig: TAKE 1 CAPSULE BY MOUTH  DAILY  Refused By: LILLIAN LOU  Reason for Refusal: Patient needs appointment    Pt last seen 08/25/22 advised to return in 6 months. Pt due for OV

## 2023-05-04 DIAGNOSIS — F51.04 CHRONIC INSOMNIA: ICD-10-CM

## 2023-05-08 RX ORDER — TRAZODONE HYDROCHLORIDE 50 MG/1
TABLET, FILM COATED ORAL
Qty: 90 TABLET | Refills: 3 | COMMUNITY
Start: 2023-05-08

## 2023-05-08 NOTE — TELEPHONE ENCOUNTER
Kori Howard is requesting a refill of:    Refused Prescriptions:                       Disp   Refills    traZODone (DESYREL) 50 MG tablet [Pharmacy*90 tab*3        Sig: TAKE 1 TABLET BY MOUTH AT  BEDTIME  Refused By: LILLIAN LOU  Reason for Refusal: Patient needs appointment    Pt has appt for tomorrow, will refill then

## 2023-05-09 ENCOUNTER — OFFICE VISIT (OUTPATIENT)
Dept: FAMILY MEDICINE | Facility: CLINIC | Age: 58
End: 2023-05-09

## 2023-05-09 VITALS
RESPIRATION RATE: 20 BRPM | HEART RATE: 81 BPM | OXYGEN SATURATION: 95 % | TEMPERATURE: 98 F | DIASTOLIC BLOOD PRESSURE: 74 MMHG | WEIGHT: 186 LBS | SYSTOLIC BLOOD PRESSURE: 118 MMHG | BODY MASS INDEX: 30.95 KG/M2

## 2023-05-09 DIAGNOSIS — F41.8 SITUATIONAL ANXIETY: ICD-10-CM

## 2023-05-09 DIAGNOSIS — F51.04 CHRONIC INSOMNIA: ICD-10-CM

## 2023-05-09 PROCEDURE — 99213 OFFICE O/P EST LOW 20 MIN: CPT | Performed by: STUDENT IN AN ORGANIZED HEALTH CARE EDUCATION/TRAINING PROGRAM

## 2023-05-09 RX ORDER — FLUOXETINE 10 MG/1
10 CAPSULE ORAL DAILY
Qty: 90 CAPSULE | Refills: 0 | Status: SHIPPED | OUTPATIENT
Start: 2023-05-09 | End: 2023-07-26

## 2023-05-09 RX ORDER — TRAZODONE HYDROCHLORIDE 50 MG/1
50 TABLET, FILM COATED ORAL AT BEDTIME
Qty: 90 TABLET | Refills: 1 | Status: SHIPPED | OUTPATIENT
Start: 2023-05-09 | End: 2023-07-26

## 2023-05-09 ASSESSMENT — ANXIETY QUESTIONNAIRES
6. BECOMING EASILY ANNOYED OR IRRITABLE: SEVERAL DAYS
5. BEING SO RESTLESS THAT IT IS HARD TO SIT STILL: NOT AT ALL
GAD7 TOTAL SCORE: 2
7. FEELING AFRAID AS IF SOMETHING AWFUL MIGHT HAPPEN: NOT AT ALL
1. FEELING NERVOUS, ANXIOUS, OR ON EDGE: NOT AT ALL
IF YOU CHECKED OFF ANY PROBLEMS ON THIS QUESTIONNAIRE, HOW DIFFICULT HAVE THESE PROBLEMS MADE IT FOR YOU TO DO YOUR WORK, TAKE CARE OF THINGS AT HOME, OR GET ALONG WITH OTHER PEOPLE: NOT DIFFICULT AT ALL
GAD7 TOTAL SCORE: 2
3. WORRYING TOO MUCH ABOUT DIFFERENT THINGS: SEVERAL DAYS
2. NOT BEING ABLE TO STOP OR CONTROL WORRYING: NOT AT ALL

## 2023-05-09 ASSESSMENT — PATIENT HEALTH QUESTIONNAIRE - PHQ9
SUM OF ALL RESPONSES TO PHQ QUESTIONS 1-9: 1
5. POOR APPETITE OR OVEREATING: NOT AT ALL

## 2023-05-09 ASSESSMENT — ASTHMA QUESTIONNAIRES: ACT_TOTALSCORE: 25

## 2023-05-09 NOTE — PROGRESS NOTES
Assessment & Plan       ICD-10-CM    1. Situational anxiety  F41.8 DISCONTINUED: FLUoxetine (PROZAC) 10 MG capsule      2. Chronic insomnia  F51.04 DISCONTINUED: traZODone (DESYREL) 50 MG tablet         Doing well overall, continue current medications, follow-up for CPE as planned    Reasons to follow-up sooner or seek emergent care reviewed.     >20 minutes spent on the date of the encounter doing chart review, history and exam, documentation and further activities per the note    Link Saleh MD, [facility]       Subjective     Kori CRUZ José Howard is a 58 year old female who presents to clinic today for the following health issues:    HPI   Chief Complaint   Patient presents with    Recheck Medication     Non fasting medication check and review for fluoxetine, working well, no concerns      Anxiety Follow-Up  Doing well, no concerns today    Social History     Tobacco Use    Smoking status: Former    Smokeless tobacco: Never    Tobacco comments:     in high school   Substance Use Topics    Alcohol use: Not Currently    Drug use: No         7/1/2022    10:13 AM 8/25/2022     3:10 PM 5/9/2023     1:11 PM   RIVKA-7 SCORE   Total Score 5 4 2         7/1/2022    10:13 AM 8/25/2022     3:10 PM 5/9/2023     1:11 PM   PHQ   PHQ-9 Total Score 7 4 1   Q9: Thoughts of better off dead/self-harm past 2 weeks Not at all Not at all Not at all           Objective    /74 (BP Location: Right arm, Patient Position: Sitting, Cuff Size: Adult Large)   Pulse 81   Temp 98  F (36.7  C) (Temporal)   Resp 20   Wt 84.4 kg (186 lb)   LMP 10/01/2019   SpO2 95%   BMI 30.95 kg/m    Body mass index is 30.95 kg/m .  Alert, NAD  NC/AT  Sclerae anicteric  Regular  Resp nonlabored  Skin warm and dry  No focal neuro deficits. Speech intact.   Appropriate affect  Normal gait.      Labs reviewed.

## 2023-05-09 NOTE — NURSING NOTE
Chief Complaint   Patient presents with     Recheck Medication     Non fasting medication check and review for fluoxetine, working well, no concerns     Pre-visit Screening:  Immunizations:  up to date  Colonoscopy:  is up to date  Mammogram: is up to date  Asthma Action Test/Plan:  ACT given today   PHQ9:  Given today  GAD7:  Given today   Questioned patient about current smoking habits Pt. quit smoking some time ago.  Ok to leave detailed message on voice mail for today's visit only yes, phone # [phone]

## 2023-07-26 ENCOUNTER — OFFICE VISIT (OUTPATIENT)
Dept: FAMILY MEDICINE | Facility: CLINIC | Age: 58
End: 2023-07-26

## 2023-07-26 VITALS
WEIGHT: 185.6 LBS | DIASTOLIC BLOOD PRESSURE: 74 MMHG | HEIGHT: 66 IN | TEMPERATURE: 97.7 F | BODY MASS INDEX: 29.83 KG/M2 | HEART RATE: 58 BPM | OXYGEN SATURATION: 98 % | SYSTOLIC BLOOD PRESSURE: 122 MMHG

## 2023-07-26 DIAGNOSIS — B00.9 HERPES SIMPLEX VIRUS INFECTION: ICD-10-CM

## 2023-07-26 DIAGNOSIS — N95.2 ATROPHIC VAGINITIS: ICD-10-CM

## 2023-07-26 DIAGNOSIS — Z13.228 SCREENING FOR METABOLIC DISORDER: ICD-10-CM

## 2023-07-26 DIAGNOSIS — F41.8 SITUATIONAL ANXIETY: ICD-10-CM

## 2023-07-26 DIAGNOSIS — F51.04 CHRONIC INSOMNIA: ICD-10-CM

## 2023-07-26 DIAGNOSIS — Z01.419 ENCOUNTER FOR GYNECOLOGICAL EXAMINATION WITHOUT ABNORMAL FINDING: Primary | ICD-10-CM

## 2023-07-26 DIAGNOSIS — Z13.220 SCREENING FOR LIPID DISORDERS: ICD-10-CM

## 2023-07-26 PROBLEM — R30.0 DYSURIA: Status: ACTIVE | Noted: 2022-10-12

## 2023-07-26 PROBLEM — N39.46 MIXED STRESS AND URGE URINARY INCONTINENCE: Status: ACTIVE | Noted: 2022-10-12

## 2023-07-26 PROBLEM — R39.9 LOWER URINARY TRACT SYMPTOMS (LUTS): Status: ACTIVE | Noted: 2022-10-12

## 2023-07-26 LAB
CHOLEST SERPL-MCNC: 209 MG/DL (ref 0–199)
CHOLEST/HDLC SERPL: 2 {RATIO} (ref 0–5)
GLUCOSE SERPL-MCNC: 97 MG/DL (ref 60–99)
HDLC SERPL-MCNC: 105 MG/DL (ref 40–150)
LDLC SERPL CALC-MCNC: 92 MG/DL (ref 0–130)
TRIGL SERPL-MCNC: 61 MG/DL (ref 0–149)

## 2023-07-26 PROCEDURE — 80061 LIPID PANEL: CPT | Performed by: PHYSICIAN ASSISTANT

## 2023-07-26 PROCEDURE — 82947 ASSAY GLUCOSE BLOOD QUANT: CPT | Performed by: PHYSICIAN ASSISTANT

## 2023-07-26 PROCEDURE — 36415 COLL VENOUS BLD VENIPUNCTURE: CPT | Performed by: PHYSICIAN ASSISTANT

## 2023-07-26 PROCEDURE — 99396 PREV VISIT EST AGE 40-64: CPT | Performed by: PHYSICIAN ASSISTANT

## 2023-07-26 RX ORDER — TRAZODONE HYDROCHLORIDE 50 MG/1
50 TABLET, FILM COATED ORAL AT BEDTIME
Qty: 90 TABLET | Refills: 3 | Status: SHIPPED | OUTPATIENT
Start: 2023-07-26 | End: 2024-07-31

## 2023-07-26 RX ORDER — VALACYCLOVIR HYDROCHLORIDE 1 G/1
TABLET, FILM COATED ORAL
Qty: 12 TABLET | Refills: 3 | Status: SHIPPED | OUTPATIENT
Start: 2023-07-26 | End: 2024-01-15

## 2023-07-26 RX ORDER — HYDROXYZINE HYDROCHLORIDE 25 MG/1
25 TABLET, FILM COATED ORAL 3 TIMES DAILY PRN
Qty: 30 TABLET | Refills: 3 | Status: SHIPPED | OUTPATIENT
Start: 2023-07-26 | End: 2024-07-31

## 2023-07-26 RX ORDER — FLUOXETINE 10 MG/1
10 CAPSULE ORAL DAILY
Qty: 90 CAPSULE | Refills: 3 | Status: SHIPPED | OUTPATIENT
Start: 2023-07-26 | End: 2024-07-31

## 2023-07-26 ASSESSMENT — ASTHMA QUESTIONNAIRES: ACT_TOTALSCORE: 24

## 2023-07-26 NOTE — PROGRESS NOTES
Chief Complaint:  Physical Exam    SUBJECTIVE:   Kori Howard is a 58 year old female presents for routine health maintenance.    Current concerns:   Anxiety:  Takes fluoxetine 10 mg daily, as well as trazodone 50 mg every night. Takes hydroxyzine as needed maybe 4-5 times/months.     HSV:  Takes Valtrex as needed. Uses rarely.    Menses are absent    Patient's last menstrual period was 10/01/2019.  Was last Pap smear normal: No.  Due for mammogram:  No    Body mass index is 29.78 kg/m .    Present exercise habits:  3-5 times/week  Present dietary habits:  eats regular meals and follows a balanced nutrition diet    Calcium intake:  Diet plus supplement  Vit D intake: is taking supplement    Is the patient a smoker? No  If yes, smoking cessation advised and counseling provided.     Cardiovascular risk factors: none    Over the past few weeks, have you felt down or depressed? Little interest or pleasure in doing things? No concerns. Anxiety controlled.     If in a relationship are there any Domestic violence concern: No    Last dental appointment:  this year  Last optical appointment:  this year    Was the patient born between [id number] and has not had Hep C testing?  Has not been tested, declines testing    I have reviewed the following histories: Past Medical History, Past Surgical History, Social History, Family History, Problem List, Medication List and Allergies    Past Medical History:   Diagnosis Date     Asthma, exercise induced 1/23/2013     Family history of diabetes mellitus      Family History   Problem Relation Age of Onset     Osteoarthritis Mother      Hypertension Mother      Diabetes Type 2  Father      Hypertension Father      Other - See Comments Son         heart murmur     Cancer No family hx of      Social History     Socioeconomic History     Marital status:      Spouse name: Armen     Number of children: 2     Years of education: 12     Highest education level: Not on file    Occupational History     Occupation:      Comment: Self   Tobacco Use     Smoking status: Former     Smokeless tobacco: Never     Tobacco comments:     in high school   Substance and Sexual Activity     Alcohol use: Not Currently     Drug use: No     Sexual activity: Yes     Partners: Male     Birth control/protection: Surgical     Comment:    Other Topics Concern      Service Not Asked     Blood Transfusions Not Asked     Caffeine Concern Not Asked     Occupational Exposure Not Asked     Hobby Hazards Not Asked     Sleep Concern Not Asked     Stress Concern Not Asked     Weight Concern Not Asked     Special Diet Not Asked     Back Care Not Asked     Exercise Yes     Bike Helmet Not Asked     Seat Belt Yes     Self-Exams No     Parent/sibling w/ CABG, MI or angioplasty before 65F 55M? Not Asked   Social History Narrative     Not on file     Social Determinants of Health     Financial Resource Strain: Low Risk  (6/16/2020)    Overall Financial Resource Strain (CARDIA)      Difficulty of Paying Living Expenses: Not hard at all   Food Insecurity: No Food Insecurity (6/16/2020)    Hunger Vital Sign      Worried About Running Out of Food in the Last Year: Never true      Ran Out of Food in the Last Year: Never true   Transportation Needs: No Transportation Needs (6/16/2020)    PRAPARE - Transportation      Lack of Transportation (Medical): No      Lack of Transportation (Non-Medical): No   Physical Activity: Not on file   Stress: Not on file   Social Connections: Not on file   Intimate Partner Violence: Not on file   Housing Stability: Not on file     Past Surgical History:   Procedure Laterality Date     BLADDER SURGERY      2009 - sling     COLONOSCOPY  01/05/2009    NORMAL/ REPEAT IN 10 years     HYSTEROSCOPY,ABLATION ENDOMETRIUM  1995     [facility] JORDAN W/O FACETEC FORAMOT/DSKC 1/2 VRT SEG, LUMBAR  1998     Z NONSPECIFIC PROCEDURE  1995    LEEP     [facility] COLONOSCOPY THRU STOMA, DIAGNOSTIC   01/05/2009    normal/ Stone/ repeat in 10 years       ROS:  E/M: NEGATIVE for ear, nose, mouth and throat problems  R: NEGATIVE for significant/chronic cough or SOB  CV: NEGATIVE for chest pain or palpitations  GI: NEGATIVE for abdominal pain, chronic diarrhea or constipation  :  NEGATIVE for dysuria, hematuria or vaginal discharge. No sexual health concerns.       Current Outpatient Medications   Medication     cetirizine HCl 10 MG CAPS     Cholecalciferol (VITAMIN D3) 3000 UNITS TABS     COMPOUNDED NON-CONTROLLED SUBSTANCE (CMPD RX) - PHARMACY TO MIX COMPOUNDED MEDICATION     cyanocobalamin (VITAMIN  B-12) 1000 MCG tablet     Emollient (COLLAGEN EX)     FISH OIL 1000 MG OR CAPS     Flaxseed, Linseed, (FLAX SEED OIL) 1000 MG capsule     FLUoxetine (PROZAC) 10 MG capsule     hydrOXYzine (ATARAX) 25 MG tablet     magnesium 100 MG CAPS     MULTIVITAMINS OR     Probiotic Product (PROBIOTIC ADVANCED PO)     traZODone (DESYREL) 50 MG tablet     valACYclovir (VALTREX) 1000 mg tablet     No current facility-administered medications for this visit.       Patient Active Problem List    Diagnosis Date Noted     Mixed stress and urge urinary incontinence 10/12/2022     Priority: Medium     Lower urinary tract symptoms (LUTS) 10/12/2022     Priority: Medium     Dysuria 10/12/2022     Priority: Medium     Pure hypercholesterolemia 07/24/2022     Priority: Medium     Menopausal syndrome (hot flashes) 03/22/2018     Priority: Medium     Other insomnia 11/30/2015     Priority: Medium     Abnormal glucose 01/05/2011     Priority: Medium     Problem list name updated by automated process. Provider to review       Herpes simplex virus (HSV) infection 12/04/2008     Priority: Medium     Problem list name updated by automated process. Provider to review       Family history of diabetes mellitus      Priority: Medium     Health Care Home 10/25/2012     Priority: Low     State Tier Level:  Tier 1  Status:  None  Care Coordinator:   "    See Letters for H Care Plan           ACP (advance care planning) 01/18/2012     Priority: Low     Advance Care Planning 2/10/2016: ACP Review of Chart / Resources Provided:  Reviewed chart for advance care plan.  Kori Howard has no plan or code status on file. Discussed available resources and provided with information. Confirmed code status reflects current choices pending further ACP discussions.  Confirmed/documented legally designated decision makers.  Added by Nicki Ellis                  Diagnosis updated by automated process. Provider to review and confirm.           OBJECTIVE:  /74 (BP Location: Right arm, Patient Position: Sitting, Cuff Size: Adult Regular)   Pulse 58   Temp 97.7  F (36.5  C) (Oral)   Ht 1.681 m (5' 6.2\")   Wt 84.2 kg (185 lb 9.6 oz)   LMP 10/01/2019   SpO2 98%   BMI 29.78 kg/m       General: 58 year old female who appears her stated age. Vital signs noted.  Head: Normocephalic  Eyes: pupils equal round reactive to light and accomodation, extra ocular movements intact  Ears: external canals and TMs free of abnormalities  Nose: patent, without mucosal abnormalities  Mouth and throat: without erythema or lesions of the mucosa  Neck: supple, without adenopathy or thyromegaly  Lungs: clear to auscultation, no wheezing or crackles  Breasts: skin without rash, no dominant mass, no nipple discharge, or axillary adenopathy  Cv: regular rate and rhythm, normal s1 and s2 without murmur or click  Abd: soft, non-tender, no masses, no hepatomegaly or splenomegaly.   (female): normal female external genitalia, normal urethral meatus, vaginal mucosa, normal cervix/adnexa/uterus without masses or discharge  Ms: normal muscle tone & symmetry  Skin: clear to inspection and with no palpable abnormalities.  Neuro: sensation and motor function grossly intact; cranial nerves without obvious abnormalities.    ASSESSMENT/PLAN:    Encounter for gynecological examination " "without abnormal finding  Kori is doing well today. [facility] update fasting labs [facility]. Refilled current medications without change for 1 year.     Herpes simplex virus infection  - valACYclovir (VALTREX) 1000 mg tablet; TAKE 2 TABLETS BY MOUTH  WITH ONSET OF SYMPTOMS:  REPEAT DOSE IN 12 HOURS    Chronic insomnia  - traZODone (DESYREL) 50 MG tablet; Take 1 tablet (50 mg) by mouth At Bedtime    Situational anxiety  - FLUoxetine (PROZAC) 10 MG capsule; Take 1 capsule (10 mg) by mouth daily  - hydrOXYzine (ATARAX) 25 MG tablet; Take 1 tablet (25 mg) by mouth 3 times daily as needed for anxiety    Atrophic vaginitis  - COMPOUNDED NON-CONTROLLED SUBSTANCE (CMPD RX) - PHARMACY TO MIX COMPOUNDED MEDICATION; Estradiol 0.02% cream (contains 0.2mg estradiol/gm of cream)- apply 1 gram daily to the vaginal area every other day    Screening for lipid disorders  - VENOUS COLLECTION  - Lipid Panel (BFP)    Screening for metabolic disorder  - VENOUS COLLECTION  - Glucose Fasting (BFP)     reports that she has quit smoking. She has never used smokeless tobacco.    Estimated body mass index is 29.78 kg/m  as calculated from the following:    Height as of this encounter: 1.681 m (5' 6.2\").    Weight as of this encounter: 84.2 kg (185 lb 9.6 oz).  Weight management plan: Discussed healthy diet and exercise guidelines    Labs pending:      Fasting glucose      Fasting lipids  Meds Suggested:      Vitamin D       Calcium  Tests Recommended:      Regular Dental Examinations        Eye exam        Mammogram yearly  Behavior Modifications:       Cardiovascular exercise 3 times per week--enough to get your Target Heart rate  Other recommendations:    Health Care directive     BMI noted and discussed      Regular breast exam     Encouraged My Chart    Counseling Resources:  ATP IV Guidelines  Pooled Cohorts Equation Calculator  Breast Cancer Risk Calculator  FRAX Risk Assessment  ICSI Preventive Guidelines  Dietary " Guidelines for Americans, 2010  USDA's MyPlate        Bridgett Decker PA-C  7/26/2023

## 2023-07-26 NOTE — NURSING NOTE
Chief Complaint   Patient presents with     Physical     Pt is fasting. Pt has no other concerns.        Pre-visit Screening:  Immunizations:  up to date  Colonoscopy:  na  Mammogram: is up to date  Asthma Action Test/Plan:  Done today   PHQ9:  na  GAD7:  na  Questioned patient about current smoking habits Pt. has never smoked.  Ok to leave detailed message on voice mail for today's visit only yes, phone # [phone]

## 2024-01-15 ENCOUNTER — MYC REFILL (OUTPATIENT)
Dept: FAMILY MEDICINE | Facility: CLINIC | Age: 59
End: 2024-01-15

## 2024-01-15 DIAGNOSIS — B00.9 HERPES SIMPLEX VIRUS INFECTION: ICD-10-CM

## 2024-01-15 NOTE — TELEPHONE ENCOUNTER
Pt changing pharmacies.    Kori Howard is requesting a refill of:    Pending Prescriptions:                       Disp   Refills    valACYclovir (VALTREX) 1000 mg tablet     12 tab*3            Sig: TAKE 2 TABLETS BY MOUTH  WITH ONSET OF SYMPTOMS:            REPEAT DOSE IN 12 HOURS

## 2024-01-16 ENCOUNTER — MYC REFILL (OUTPATIENT)
Dept: FAMILY MEDICINE | Facility: CLINIC | Age: 59
End: 2024-01-16

## 2024-01-16 DIAGNOSIS — B00.9 HERPES SIMPLEX VIRUS INFECTION: ICD-10-CM

## 2024-01-16 RX ORDER — VALACYCLOVIR HYDROCHLORIDE 1 G/1
TABLET, FILM COATED ORAL
Qty: 12 TABLET | Refills: 3 | Status: SHIPPED | OUTPATIENT
Start: 2024-01-16 | End: 2024-07-31

## 2024-01-16 RX ORDER — VALACYCLOVIR HYDROCHLORIDE 1 G/1
TABLET, FILM COATED ORAL
Qty: 12 TABLET | Refills: 3 | Status: CANCELLED | OUTPATIENT
Start: 2024-01-16

## 2024-02-20 ENCOUNTER — OFFICE VISIT (OUTPATIENT)
Dept: FAMILY MEDICINE | Facility: CLINIC | Age: 59
End: 2024-02-20

## 2024-02-20 VITALS
WEIGHT: 188 LBS | HEART RATE: 67 BPM | OXYGEN SATURATION: 98 % | SYSTOLIC BLOOD PRESSURE: 124 MMHG | TEMPERATURE: 97.7 F | DIASTOLIC BLOOD PRESSURE: 70 MMHG | BODY MASS INDEX: 30.22 KG/M2 | HEIGHT: 66 IN

## 2024-02-20 DIAGNOSIS — N93.9 ABNORMAL UTERINE BLEEDING: Primary | ICD-10-CM

## 2024-02-20 PROCEDURE — 99213 OFFICE O/P EST LOW 20 MIN: CPT | Performed by: PHYSICIAN ASSISTANT

## 2024-02-20 PROCEDURE — 99459 PELVIC EXAMINATION: CPT | Performed by: PHYSICIAN ASSISTANT

## 2024-02-20 RX ORDER — OINTMENT BASE NO.88
OINTMENT (GRAM) TRANSDERMAL
COMMUNITY
Start: 2024-01-26

## 2024-02-20 NOTE — PROGRESS NOTES
"CC: Vaginal bleeding    History:  Kori started to have some brown vaginal discharge since 2/8/2024. Around 2/16/2024 became more of a bright red bleeding with clots. Enough to use a tampon. Relatively heavy at times, but not filling a tampon per hour. No pain, cramping that started with this, but does mention an occasional mild RLQ pain over the past 1 year. Otherwise feeling well- no fever, sweats, chills, weight changes, dizziness, fatigue. She had been on a flight to [place] prior to onset, as well as she had been more sexually active.     Menstrual cycles went away naturally at age 53 approximately 5 years ago. She does have history of endometrial ablation 2009. LEEP procedure in 1995, with most recent pap with cotesting 7/2021 and reassuring. Also mentions history of uterine fibroid with her OBGYN, but I do not have record of this.     PMH, MEDICATIONS, ALLERGIES, SOCIAL AND FAMILY HISTORY in [facility] and reviewed by me personally.    ROS negative other than the symptoms noted above in the HPI.      Examination   /70 (BP Location: Left arm, Patient Position: Sitting, Cuff Size: Adult Large)   Pulse 67   Temp 97.7  F (36.5  C) (Temporal)   Ht 1.676 m (5' 6\")   Wt 85.3 kg (188 lb)   LMP 10/01/2019   SpO2 98%   BMI 30.34 kg/m       Constitutional: Sitting comfortably, in no acute distress. Vital signs noted  Neck:  no adenopathy, trachea midline and normal to palpation, thyroid normal to palpation  Cardiovascular:  regular rate and rhythm, no murmurs, clicks, or gallops  Respiratory:  normal respiratory rate and rhythm, lungs clear to auscultation  : External genitalia without abnormality. Vaginal canal with moderate amount of bright red blood. Cervix intact without abnormality. Cervical os with blood. Bimanual exam without tenderness, no palpable enlargement.   SKIN: No jaundice/pallor/rash.   Psychiatric: mentation appears normal and affect normal/bright    A/P    ICD-10-CM    1. Abnormal uterine " bleeding  N93.9           DISCUSSION:  AUB:  Exam confirms bleeding coming from cervical os. Recommended pt undergo pelvic US with follow-up with GYN provider to discuss findings, next steps. Reassured that she is not having any dizziness, fatigue, but should monitor for these to worsen with blood loss. I will submit referral to GYN. Contact me sooner if worsening. ER with significant worsening.    follow up visit: As needed    Bridgett Decker PA-C  [facility]

## 2024-02-20 NOTE — NURSING NOTE
Chief Complaint   Patient presents with    Vaginal Bleeding     Postmenopausal vaginal bleeding-- last period was five years ago.  This bleeding began on 2/8/24 with brownish blood, then on day 5 became red and small clots. The clots have subsided but still bleeding red blood.           Pre-visit Screening:  Immunizations:  up to date  Colonoscopy:  is up to date  Mammogram: is up to date  Asthma Action Test/Plan:  NA  PHQ9:  NA  GAD7:  NA  Questioned patient about current smoking habits Pt. has never smoked.  Ok to leave detailed message on voice mail for today's visit only Yes, phone # [phone]

## 2024-03-01 ENCOUNTER — TRANSFERRED RECORDS (OUTPATIENT)
Dept: FAMILY MEDICINE | Facility: CLINIC | Age: 59
End: 2024-03-01

## 2024-03-05 PROCEDURE — 88305 TISSUE EXAM BY PATHOLOGIST: CPT | Mod: TC,ORL | Performed by: OBSTETRICS & GYNECOLOGY

## 2024-03-06 ENCOUNTER — LAB REQUISITION (OUTPATIENT)
Dept: LAB | Facility: CLINIC | Age: 59
End: 2024-03-06
Payer: COMMERCIAL

## 2024-03-06 DIAGNOSIS — N95.0 POSTMENOPAUSAL BLEEDING: ICD-10-CM

## 2024-03-07 LAB
PATH REPORT.COMMENTS IMP SPEC: NORMAL
PATH REPORT.COMMENTS IMP SPEC: NORMAL
PATH REPORT.FINAL DX SPEC: NORMAL
PATH REPORT.GROSS SPEC: NORMAL
PATH REPORT.MICROSCOPIC SPEC OTHER STN: NORMAL
PATH REPORT.RELEVANT HX SPEC: NORMAL
PHOTO IMAGE: NORMAL

## 2024-03-07 PROCEDURE — 88305 TISSUE EXAM BY PATHOLOGIST: CPT | Mod: 26 | Performed by: PATHOLOGY

## 2024-06-05 DIAGNOSIS — F41.8 SITUATIONAL ANXIETY: ICD-10-CM

## 2024-06-05 DIAGNOSIS — F51.04 CHRONIC INSOMNIA: ICD-10-CM

## 2024-06-06 RX ORDER — FLUOXETINE 10 MG/1
10 CAPSULE ORAL DAILY
COMMUNITY
Start: 2024-06-06

## 2024-06-06 RX ORDER — TRAZODONE HYDROCHLORIDE 50 MG/1
50 TABLET, FILM COATED ORAL AT BEDTIME
COMMUNITY
Start: 2024-06-06

## 2024-06-06 NOTE — TELEPHONE ENCOUNTER
Kori Howard is requesting a refill of:    Refused Prescriptions:                       Disp   Refills    FLUoxetine (PROZAC) 10 MG capsule [Pharmac*                Sig: TAKE 1 CAPSULE BY MOUTH    DAILY  Refused By: LILLIAN LOU  Reason for Refusal: Patient has requested refill too soon    traZODone (DESYREL) 50 MG tablet [Pharmacy*                Sig: TAKE 1 TABLET BY MOUTH AT  BEDTIME  Refused By: LILLIAN LOU  Reason for Refusal: Patient has requested refill too soon    Refills sent on 07/26/23 for 90 tab with 3 refills, enough medication until end of July when pt is due for OV

## 2024-07-26 NOTE — PROGRESS NOTES
Preventive Care Visit  [facility], PSAMI Lynch MD, Family Medicine  Jul 31, 2024       SUBJECTIVE:   Kori is a 59 year old, presenting for the following:  Physical (Fasting today), Recheck Medication (Refill medications), and Consult (Foggy brain, she thinks this may be hormonal or age related)      HPI      Here for a physical.  She would like refills on her medications: mood--prozac and hydroxyzine, taking this intermittently. Trazodone for sleep. Valtrex for cold sores.  Has noticed tenderness in the right axilla for 2 weeks, just had a mammogram yesterday, screening. No mass noted. Thinks this is more than she should have for physical activity or lifting  Also went to gynecology for d+c, polyp removal.  Having brain fog.              Social History     Tobacco Use    Smoking status: Former     Passive exposure: Never    Smokeless tobacco: Never    Tobacco comments:     in high school   Substance Use Topics    Alcohol use: Not Currently     Alcohol/week: 14.0 standard drinks of alcohol     Types: 14 Glasses of wine per week              No data to display            Drinking 2 per day. Knows she needs to cut back.  Reviewed orders with patient.  Reviewed health maintenance and updated orders accordingly - Yes  BP Readings from Last 3 Encounters:   07/31/24 122/78   02/20/24 124/70   07/26/23 122/74    Wt Readings from Last 3 Encounters:   07/31/24 88 kg (194 lb)   02/20/24 85.3 kg (188 lb)   07/26/23 84.2 kg (185 lb 9.6 oz)                  Patient Active Problem List   Diagnosis    Family history of diabetes mellitus    Herpes simplex virus (HSV) infection    Abnormal glucose    ACP (advance care planning)    Other insomnia    Menopausal syndrome (hot flashes)    Pure hypercholesterolemia    Mixed stress and urge urinary incontinence    Lower urinary tract symptoms (LUTS)    Dysuria     Past Surgical History:   Procedure Laterality Date    BLADDER SURGERY      2009 - sling     COLONOSCOPY  01/05/2009    NORMAL/ REPEAT IN 10 years    HYSTEROSCOPY,ABLATION ENDOMETRIUM  2009    [facility] JORDAN W/O FACETEC FORAMOT/[facility]KC 1/2 VRT SEG, LUMBAR  1998    [facility] NONSPECIFIC PROCEDURE  1995    LEEP    [facility] COLONOSCOPY THRU STOMA, DIAGNOSTIC  01/05/2009    normal/ Stone/ repeat in 10 years       Social History     Tobacco Use    Smoking status: Former     Passive exposure: Never    Smokeless tobacco: Never    Tobacco comments:     in high school   Substance Use Topics    Alcohol use: Not Currently     Alcohol/week: 14.0 standard drinks of alcohol     Types: 14 Glasses of wine per week     Family History   Problem Relation Age of Onset    Osteoarthritis Mother     Hypertension Mother     Diabetes Type 2  Father     Hypertension Father     Other - See Comments Son         heart murmur    Cancer No family hx of          Current Outpatient Medications   Medication Sig Dispense Refill    cetirizine HCl 10 MG CAPS       Cholecalciferol (VITAMIN D3) 3000 UNITS TABS       COMPOUNDED NON-CONTROLLED SUBSTANCE (CMPD RX) - PHARMACY TO MIX COMPOUNDED MEDICATION Estradiol 0.02% cream (contains 0.2mg estradiol/gm of cream)- apply 1 gram daily to the vaginal area every other day 30 g 11    Emollient (COLLAGEN EX)       Flaxseed, Linseed, (FLAX SEED OIL) 1000 MG capsule Take 1 capsule by mouth daily      FLUoxetine (PROZAC) 10 MG capsule Take 1 capsule (10 mg) by mouth daily 90 capsule 3    Hormone Cream Base (HRT HEAVY) CREA       hydrOXYzine HCl (ATARAX) 25 MG tablet Take 1 tablet (25 mg) by mouth 3 times daily as needed for anxiety 30 tablet 3    magnesium 100 MG CAPS       MULTIVITAMINS OR None Entered      Probiotic Product (PROBIOTIC ADVANCED PO)       traZODone (DESYREL) 50 MG tablet Take 1 tablet (50 mg) by mouth at bedtime 90 tablet 3    valACYclovir (VALTREX) 1000 mg tablet TAKE 2 TABLETS BY MOUTH  WITH ONSET OF SYMPTOMS:  REPEAT DOSE IN 12 HOURS 12 tablet 3       Breast Cancer Screening:    FHS-7:        No data to  "display              Mammogram yesterday, will send for diagnostic mammogram    Pertinent mammograms are reviewed under the imaging tab.      History of abnormal Pap smear: pap today            Reviewed and updated as needed this visit by clinical staff   Tobacco    Problems             Reviewed and updated as needed this visit by Provider                  Past Medical History:   Diagnosis Date    Asthma, exercise induced 1/23/2013    Family history of diabetes mellitus       Past Surgical History:   Procedure Laterality Date    BLADDER SURGERY      2009 - sling    COLONOSCOPY  01/05/2009    NORMAL/ REPEAT IN 10 years    HYSTEROSCOPY,ABLATION ENDOMETRIUM  2009    [facility] JORDAN W/O FACETEC FORAMOT/DSKC 1/2 VRT SEG, LUMBAR  1998    [facility] NONSPECIFIC PROCEDURE  1995    LEEP    [facility] COLONOSCOPY THRU STOMA, DIAGNOSTIC  01/05/2009    normal/ Stone/ repeat in 10 years     Review of Systems    Review of Systems  Constitutional, HEENT, cardiovascular, pulmonary, gi and gu systems are negative, except as otherwise noted.    OBJECTIVE:   /78 (BP Location: Right arm, Patient Position: Sitting, Cuff Size: Adult Large)   Pulse 65   Temp 98.2  F (36.8  C) (Temporal)   Ht 1.676 m (5' 6\")   Wt 88 kg (194 lb)   LMP 10/01/2019   SpO2 96%   BMI 31.31 kg/m     Estimated body mass index is 31.31 kg/m  as calculated from the following:    Height as of this encounter: 1.676 m (5' 6\").    Weight as of this encounter: 88 kg (194 lb).  Physical Exam  GENERAL: alert and no distress  EYES: Eyes grossly normal to inspection, PERRL and conjunctivae and sclerae normal  HENT: ear canals and TM's normal, nose and mouth without ulcers or lesions  NECK: no adenopathy, no asymmetry, masses, or scars  RESP: lungs clear to auscultation - no rales, rhonchi or wheezes  BREAST: normal without masses, tenderness or nipple discharge and no palpable axillary masses or adenopathy, some tenderness in right axilla to palpation  CV: regular rate and rhythm, " normal S1 S2, no S3 or S4, no murmur, click or rub, no peripheral edema  ABDOMEN: soft, nontender, no hepatosplenomegaly, no masses and bowel sounds normal   (female) w/bimanual: normal female external genitalia, normal urethral meatus, normal vaginal mucosa, and normal cervix/adnexa/uterus without masses or discharge  MS: no gross musculoskeletal defects noted, no edema  SKIN: no suspicious lesions or rashes  NEURO: Normal strength and tone, mentation intact and speech normal  PSYCH: mentation appears normal, affect normal/bright  Small friable cervical os polyp, tender, she had pain with exam    Diagnostic Test Results:  Labs reviewed in Epic  No results found for any visits on 07/31/24.    ASSESSMENT/PLAN:   1. Encounter for routine history and physical exam in female patient    - VENOUS COLLECTION  - Basic Metabolic Panel (BFP)  - Lipid Panel (BFP)    2. Situational anxiety  Controlled on medications, refilled.  - FLUoxetine (PROZAC) 10 MG capsule; Take 1 capsule (10 mg) by mouth daily  Dispense: 90 capsule; Refill: 3  - hydrOXYzine HCl (ATARAX) 25 MG tablet; Take 1 tablet (25 mg) by mouth 3 times daily as needed for anxiety  Dispense: 30 tablet; Refill: 3    3. Chronic insomnia  Refilled.  - traZODone (DESYREL) 50 MG tablet; Take 1 tablet (50 mg) by mouth at bedtime  Dispense: 90 tablet; Refill: 3    4. Herpes simplex virus infection  Refilled.  - valACYclovir (VALTREX) 1000 mg tablet; TAKE 2 TABLETS BY MOUTH  WITH ONSET OF SYMPTOMS:  REPEAT DOSE IN 12 HOURS  Dispense: 12 tablet; Refill: 3    5. Atrophic vaginitis  Refilled.  - COMPOUNDED NON-CONTROLLED SUBSTANCE (CMPD RX) - PHARMACY TO MIX COMPOUNDED MEDICATION; Estradiol 0.02% cream (contains 0.2mg estradiol/gm of cream)- apply 1 gram daily to the vaginal area every other day  Dispense: 30 g; Refill: 11    6. Screening for cervical cancer    - THINPREP TIS PAP AND HPV mRNA E6/E7 ([facility])  - [place] PELVIC EXAMINATION    7. Brain fog  Discussed, offered referral  "to neurology, declined. Check tsh.  - TSH WITH FREE T4 REFLEX ([facility])    8. Axillary pain, right  She had screening mammo yesterday, referral done for diagnostic mammogram/us.  - Mammo diagnostic  digital (bilateral)*; Future    9. Cervical polyp  Referral back to see gynecology.  - Ob/Gyn  Referral - To a [facility] Location (Use POS/Location)     Patient has been advised of split billing requirements and indicates understanding: Yes      Counseling  Reviewed preventive health counseling, as reflected in patient instructions       Regular exercise       Healthy diet/nutrition      BMI  Estimated body mass index is 31.31 kg/m  as calculated from the following:    Height as of this encounter: 1.676 m (5' 6\").    Weight as of this encounter: 88 kg (194 lb).         She reports that she has quit smoking. She has never been exposed to tobacco smoke. She has never used smokeless tobacco.          Signed Electronically by: Camila Lynch MD  "

## 2024-07-31 ENCOUNTER — OFFICE VISIT (OUTPATIENT)
Dept: FAMILY MEDICINE | Facility: CLINIC | Age: 59
End: 2024-07-31

## 2024-07-31 VITALS
TEMPERATURE: 98.2 F | SYSTOLIC BLOOD PRESSURE: 122 MMHG | BODY MASS INDEX: 31.18 KG/M2 | OXYGEN SATURATION: 96 % | HEART RATE: 65 BPM | DIASTOLIC BLOOD PRESSURE: 78 MMHG | WEIGHT: 194 LBS | HEIGHT: 66 IN

## 2024-07-31 DIAGNOSIS — N84.1 CERVICAL POLYP: ICD-10-CM

## 2024-07-31 DIAGNOSIS — N95.2 ATROPHIC VAGINITIS: ICD-10-CM

## 2024-07-31 DIAGNOSIS — Z00.00 ENCOUNTER FOR ROUTINE HISTORY AND PHYSICAL EXAM IN FEMALE PATIENT: Primary | ICD-10-CM

## 2024-07-31 DIAGNOSIS — R41.89 BRAIN FOG: ICD-10-CM

## 2024-07-31 DIAGNOSIS — F41.8 SITUATIONAL ANXIETY: ICD-10-CM

## 2024-07-31 DIAGNOSIS — F51.04 CHRONIC INSOMNIA: ICD-10-CM

## 2024-07-31 DIAGNOSIS — M79.621 AXILLARY PAIN, RIGHT: ICD-10-CM

## 2024-07-31 DIAGNOSIS — B00.9 HERPES SIMPLEX VIRUS INFECTION: ICD-10-CM

## 2024-07-31 DIAGNOSIS — Z12.4 SCREENING FOR CERVICAL CANCER: ICD-10-CM

## 2024-07-31 LAB
BUN SERPL-MCNC: 12 MG/DL (ref 7–25)
BUN/CREATININE RATIO: 16 (ref 6–32)
CALCIUM SERPL-MCNC: 9.4 MG/DL (ref 8.6–10.3)
CHLORIDE SERPLBLD-SCNC: 105 MMOL/L (ref 98–110)
CHOLEST SERPL-MCNC: 194 MG/DL (ref 0–199)
CHOLEST/HDLC SERPL: 2 {RATIO} (ref 0–5)
CO2 SERPL-SCNC: 26.7 MMOL/L (ref 20–32)
CREAT SERPL-MCNC: 0.74 MG/DL (ref 0.6–1.3)
GLUCOSE SERPL-MCNC: 93 MG/DL (ref 60–99)
HDLC SERPL-MCNC: 90 MG/DL (ref 40–150)
LDLC SERPL CALC-MCNC: 92 MG/DL
POTASSIUM SERPL-SCNC: 4.35 MMOL/L (ref 3.5–5.3)
SODIUM SERPL-SCNC: 138.8 MMOL/L (ref 135–146)
TRIGL SERPL-MCNC: 60 MG/DL (ref 0–149)

## 2024-07-31 PROCEDURE — 99396 PREV VISIT EST AGE 40-64: CPT | Mod: 25 | Performed by: FAMILY MEDICINE

## 2024-07-31 PROCEDURE — 80048 BASIC METABOLIC PNL TOTAL CA: CPT | Performed by: FAMILY MEDICINE

## 2024-07-31 PROCEDURE — 99214 OFFICE O/P EST MOD 30 MIN: CPT | Mod: 25 | Performed by: FAMILY MEDICINE

## 2024-07-31 PROCEDURE — 80061 LIPID PANEL: CPT | Performed by: FAMILY MEDICINE

## 2024-07-31 PROCEDURE — 99459 PELVIC EXAMINATION: CPT | Performed by: FAMILY MEDICINE

## 2024-07-31 PROCEDURE — 36415 COLL VENOUS BLD VENIPUNCTURE: CPT | Performed by: FAMILY MEDICINE

## 2024-07-31 RX ORDER — VALACYCLOVIR HYDROCHLORIDE 1 G/1
TABLET, FILM COATED ORAL
Qty: 12 TABLET | Refills: 3 | Status: SHIPPED | OUTPATIENT
Start: 2024-07-31

## 2024-07-31 RX ORDER — FLUOXETINE 10 MG/1
10 CAPSULE ORAL DAILY
Qty: 90 CAPSULE | Refills: 3 | Status: SHIPPED | OUTPATIENT
Start: 2024-07-31

## 2024-07-31 RX ORDER — TRAZODONE HYDROCHLORIDE 50 MG/1
50 TABLET, FILM COATED ORAL AT BEDTIME
Qty: 90 TABLET | Refills: 3 | Status: SHIPPED | OUTPATIENT
Start: 2024-07-31

## 2024-07-31 RX ORDER — HYDROXYZINE HYDROCHLORIDE 25 MG/1
25 TABLET, FILM COATED ORAL 3 TIMES DAILY PRN
Qty: 30 TABLET | Refills: 3 | Status: SHIPPED | OUTPATIENT
Start: 2024-07-31

## 2024-07-31 ASSESSMENT — ANXIETY QUESTIONNAIRES
2. NOT BEING ABLE TO STOP OR CONTROL WORRYING: NOT AT ALL
GAD7 TOTAL SCORE: 4
6. BECOMING EASILY ANNOYED OR IRRITABLE: SEVERAL DAYS
7. FEELING AFRAID AS IF SOMETHING AWFUL MIGHT HAPPEN: SEVERAL DAYS
5. BEING SO RESTLESS THAT IT IS HARD TO SIT STILL: NOT AT ALL
GAD7 TOTAL SCORE: 4
1. FEELING NERVOUS, ANXIOUS, OR ON EDGE: SEVERAL DAYS
IF YOU CHECKED OFF ANY PROBLEMS ON THIS QUESTIONNAIRE, HOW DIFFICULT HAVE THESE PROBLEMS MADE IT FOR YOU TO DO YOUR WORK, TAKE CARE OF THINGS AT HOME, OR GET ALONG WITH OTHER PEOPLE: NOT DIFFICULT AT ALL
3. WORRYING TOO MUCH ABOUT DIFFERENT THINGS: SEVERAL DAYS

## 2024-07-31 ASSESSMENT — PATIENT HEALTH QUESTIONNAIRE - PHQ9
SUM OF ALL RESPONSES TO PHQ QUESTIONS 1-9: 3
5. POOR APPETITE OR OVEREATING: NOT AT ALL

## 2024-08-01 LAB — TSH SERPL-ACNC: 2.02 MIU/L (ref 0.4–4.5)

## 2024-08-02 LAB
CLINICAL HISTORY - QUEST: NORMAL
COMMENT - QUEST: NORMAL
COMMENT - QUEST: NORMAL
CYTOTECHNOLOGIST - QUEST: NORMAL
DESCRIPTIVE DIAGNOSIS - QUEST: NORMAL
HPV MRNA E6/E7: NOT DETECTED
LAST PAP DX - QUEST: NORMAL
LMP - QUEST: NORMAL
PREV BX DX - QUEST: NORMAL
SOURCE: NORMAL
STATEMENT OF ADEQUACY - QUEST: NORMAL

## 2024-10-21 ENCOUNTER — TRANSFERRED RECORDS (OUTPATIENT)
Dept: FAMILY MEDICINE | Facility: CLINIC | Age: 59
End: 2024-10-21

## 2025-01-02 ENCOUNTER — APPOINTMENT (OUTPATIENT)
Age: 60
Setting detail: DERMATOLOGY
End: 2025-01-02

## 2025-01-02 DIAGNOSIS — L82.1 OTHER SEBORRHEIC KERATOSIS: ICD-10-CM

## 2025-01-02 PROBLEM — D48.5 NEOPLASM OF UNCERTAIN BEHAVIOR OF SKIN: Status: ACTIVE | Noted: 2025-01-02

## 2025-01-02 PROCEDURE — ? BIOPSY BY SHAVE METHOD

## 2025-01-02 PROCEDURE — ? COUNSELING

## 2025-01-02 PROCEDURE — ? BENIGN DESTRUCTION COSMETIC

## 2025-01-02 PROCEDURE — 11102 TANGNTL BX SKIN SINGLE LES: CPT

## 2025-01-02 PROCEDURE — ? INVENTORY

## 2025-01-02 ASSESSMENT — LOCATION SIMPLE DESCRIPTION DERM
LOCATION SIMPLE: RIGHT CHEEK
LOCATION SIMPLE: NOSE
LOCATION SIMPLE: RIGHT TEMPLE
LOCATION SIMPLE: LEFT EYEBROW

## 2025-01-02 ASSESSMENT — LOCATION DETAILED DESCRIPTION DERM
LOCATION DETAILED: NASAL ROOT
LOCATION DETAILED: RIGHT SUPERIOR MEDIAL MALAR CHEEK
LOCATION DETAILED: RIGHT INFERIOR TEMPLE
LOCATION DETAILED: LEFT LATERAL EYEBROW
LOCATION DETAILED: RIGHT CENTRAL BUCCAL CHEEK

## 2025-01-02 ASSESSMENT — LOCATION ZONE DERM
LOCATION ZONE: FACE
LOCATION ZONE: NOSE

## 2025-01-14 ENCOUNTER — TRANSFERRED RECORDS (OUTPATIENT)
Dept: FAMILY MEDICINE | Facility: CLINIC | Age: 60
End: 2025-01-14

## 2025-02-10 ENCOUNTER — MYC REFILL (OUTPATIENT)
Dept: FAMILY MEDICINE | Facility: CLINIC | Age: 60
End: 2025-02-10

## 2025-02-10 DIAGNOSIS — F41.8 SITUATIONAL ANXIETY: ICD-10-CM

## 2025-02-10 RX ORDER — FLUOXETINE 10 MG/1
10 CAPSULE ORAL DAILY
Qty: 90 CAPSULE | Refills: 3 | Status: CANCELLED | OUTPATIENT
Start: 2025-02-10

## 2025-05-10 ENCOUNTER — MYC REFILL (OUTPATIENT)
Dept: FAMILY MEDICINE | Facility: CLINIC | Age: 60
End: 2025-05-10

## 2025-05-10 DIAGNOSIS — F51.04 CHRONIC INSOMNIA: ICD-10-CM

## 2025-05-10 DIAGNOSIS — B00.9 HERPES SIMPLEX VIRUS INFECTION: ICD-10-CM

## 2025-05-10 RX ORDER — VALACYCLOVIR HYDROCHLORIDE 1 G/1
TABLET, FILM COATED ORAL
Qty: 12 TABLET | Refills: 3 | Status: CANCELLED | OUTPATIENT
Start: 2025-05-10

## 2025-05-10 RX ORDER — TRAZODONE HYDROCHLORIDE 50 MG/1
50 TABLET ORAL AT BEDTIME
Qty: 90 TABLET | Refills: 3 | Status: CANCELLED | OUTPATIENT
Start: 2025-05-10

## 2025-05-12 NOTE — TELEPHONE ENCOUNTER
Patient is requesting a refill of  Pending Prescriptions:                       Disp   Refills    traZODone (DESYREL) 50 MG tablet          90 tab*3            Sig: Take 1 tablet (50 mg) by mouth at bedtime.    valACYclovir (VALTREX) 1000 mg tablet     12 tab*3            Sig: TAKE 2 TABLETS BY MOUTH  WITH ONSET OF SYMPTOMS:            REPEAT DOSE IN 12 HOURS